# Patient Record
Sex: FEMALE
[De-identification: names, ages, dates, MRNs, and addresses within clinical notes are randomized per-mention and may not be internally consistent; named-entity substitution may affect disease eponyms.]

---

## 2017-12-05 ENCOUNTER — HOSPITAL ENCOUNTER (INPATIENT)
Dept: HOSPITAL 5 - ED | Age: 52
LOS: 3 days | Discharge: HOME | DRG: 280 | End: 2017-12-08
Attending: INTERNAL MEDICINE | Admitting: INTERNAL MEDICINE
Payer: MEDICARE

## 2017-12-05 DIAGNOSIS — I24.9: ICD-10-CM

## 2017-12-05 DIAGNOSIS — Z88.5: ICD-10-CM

## 2017-12-05 DIAGNOSIS — I69.354: ICD-10-CM

## 2017-12-05 DIAGNOSIS — I25.2: ICD-10-CM

## 2017-12-05 DIAGNOSIS — I26.99: ICD-10-CM

## 2017-12-05 DIAGNOSIS — J96.20: ICD-10-CM

## 2017-12-05 DIAGNOSIS — E66.01: ICD-10-CM

## 2017-12-05 DIAGNOSIS — Z82.49: ICD-10-CM

## 2017-12-05 DIAGNOSIS — Z88.8: ICD-10-CM

## 2017-12-05 DIAGNOSIS — Z83.3: ICD-10-CM

## 2017-12-05 DIAGNOSIS — Z88.1: ICD-10-CM

## 2017-12-05 DIAGNOSIS — Z79.4: ICD-10-CM

## 2017-12-05 DIAGNOSIS — D63.1: ICD-10-CM

## 2017-12-05 DIAGNOSIS — I13.2: ICD-10-CM

## 2017-12-05 DIAGNOSIS — Z99.2: ICD-10-CM

## 2017-12-05 DIAGNOSIS — I50.9: ICD-10-CM

## 2017-12-05 DIAGNOSIS — E11.22: ICD-10-CM

## 2017-12-05 DIAGNOSIS — I21.A1: Primary | ICD-10-CM

## 2017-12-05 DIAGNOSIS — J18.9: ICD-10-CM

## 2017-12-05 DIAGNOSIS — Z86.711: ICD-10-CM

## 2017-12-05 DIAGNOSIS — Z79.82: ICD-10-CM

## 2017-12-05 DIAGNOSIS — Z79.899: ICD-10-CM

## 2017-12-05 DIAGNOSIS — N18.6: ICD-10-CM

## 2017-12-05 LAB
ANION GAP SERPL CALC-SCNC: 20 MMOL/L
APTT BLD: 102.6 SEC. (ref 24.2–36.6)
BASOPHILS NFR BLD AUTO: 0.5 % (ref 0–1.8)
BUN SERPL-MCNC: 17 MG/DL (ref 7–17)
BUN/CREAT SERPL: 3 %
CALCIUM SERPL-MCNC: 8.3 MG/DL (ref 8.4–10.2)
CHLORIDE SERPL-SCNC: 98.3 MMOL/L (ref 98–107)
CHOLEST SERPL-MCNC: 173 MG/DL (ref 50–199)
CO2 SERPL-SCNC: 29 MMOL/L (ref 22–30)
EOSINOPHIL NFR BLD AUTO: 3.6 % (ref 0–4.3)
GLUCOSE SERPL-MCNC: 114 MG/DL (ref 65–100)
HCT VFR BLD CALC: 31.3 % (ref 30.3–42.9)
HCT VFR BLD CALC: 32.3 % (ref 30.3–42.9)
HDLC SERPL-MCNC: 34 MG/DL (ref 40–59)
HGB BLD-MCNC: 10.1 GM/DL (ref 10.1–14.3)
HGB BLD-MCNC: 10.1 GM/DL (ref 10.1–14.3)
INR PPP: 1.2 (ref 0.87–1.13)
MCH RBC QN AUTO: 30 PG (ref 28–32)
MCHC RBC AUTO-ENTMCNC: 31 % (ref 30–34)
MCV RBC AUTO: 95 FL (ref 79–97)
PLATELET # BLD: 180 K/MM3 (ref 140–440)
PLATELET # BLD: 199 K/MM3 (ref 140–440)
POTASSIUM SERPL-SCNC: 4.5 MMOL/L (ref 3.6–5)
RBC # BLD AUTO: 3.39 M/MM3 (ref 3.65–5.03)
SODIUM SERPL-SCNC: 143 MMOL/L (ref 137–145)
TRIGL SERPL-MCNC: 190 MG/DL (ref 2–149)
WBC # BLD AUTO: 8.2 K/MM3 (ref 4.5–11)

## 2017-12-05 PROCEDURE — 85379 FIBRIN DEGRADATION QUANT: CPT

## 2017-12-05 PROCEDURE — 85730 THROMBOPLASTIN TIME PARTIAL: CPT

## 2017-12-05 PROCEDURE — 80074 ACUTE HEPATITIS PANEL: CPT

## 2017-12-05 PROCEDURE — 96365 THER/PROPH/DIAG IV INF INIT: CPT

## 2017-12-05 PROCEDURE — 93005 ELECTROCARDIOGRAM TRACING: CPT

## 2017-12-05 PROCEDURE — 84484 ASSAY OF TROPONIN QUANT: CPT

## 2017-12-05 PROCEDURE — 36415 COLL VENOUS BLD VENIPUNCTURE: CPT

## 2017-12-05 PROCEDURE — 85049 AUTOMATED PLATELET COUNT: CPT

## 2017-12-05 PROCEDURE — 96376 TX/PRO/DX INJ SAME DRUG ADON: CPT

## 2017-12-05 PROCEDURE — 80048 BASIC METABOLIC PNL TOTAL CA: CPT

## 2017-12-05 PROCEDURE — 85018 HEMOGLOBIN: CPT

## 2017-12-05 PROCEDURE — 80061 LIPID PANEL: CPT

## 2017-12-05 PROCEDURE — 85025 COMPLETE CBC W/AUTO DIFF WBC: CPT

## 2017-12-05 PROCEDURE — 83880 ASSAY OF NATRIURETIC PEPTIDE: CPT

## 2017-12-05 PROCEDURE — 93306 TTE W/DOPPLER COMPLETE: CPT

## 2017-12-05 PROCEDURE — 94660 CPAP INITIATION&MGMT: CPT

## 2017-12-05 PROCEDURE — 93010 ELECTROCARDIOGRAM REPORT: CPT

## 2017-12-05 PROCEDURE — 94640 AIRWAY INHALATION TREATMENT: CPT

## 2017-12-05 PROCEDURE — 87040 BLOOD CULTURE FOR BACTERIA: CPT

## 2017-12-05 PROCEDURE — 71010: CPT

## 2017-12-05 PROCEDURE — 85610 PROTHROMBIN TIME: CPT

## 2017-12-05 PROCEDURE — 85014 HEMATOCRIT: CPT

## 2017-12-05 PROCEDURE — 94760 N-INVAS EAR/PLS OXIMETRY 1: CPT

## 2017-12-05 PROCEDURE — 96375 TX/PRO/DX INJ NEW DRUG ADDON: CPT

## 2017-12-05 RX ADMIN — FAMOTIDINE SCH MG: 10 TABLET ORAL at 22:34

## 2017-12-05 RX ADMIN — NYSTATIN SCH APPLIC: 100000 OINTMENT TOPICAL at 22:21

## 2017-12-05 RX ADMIN — CARVEDILOL SCH: 6.25 TABLET, FILM COATED ORAL at 22:07

## 2017-12-05 NOTE — EMERGENCY DEPARTMENT REPORT
ED Chest Pain HPI





- General


Chief Complaint: Chest Pain


Stated Complaint: CHEST PAIN


Time Seen by Provider: 12/05/17 17:00


Source: patient, EMS, old records reviewed (no other medical record  available 

for review)


Mode of arrival: Stretcher


Limitations: Physical Limitation





- History of Present Illness


Initial Comments: 





52-year-old female with a past medical history morbid obesity, CVA with 

residual left-sided weakness, diabetes type 2, COPD on 3 L of oxygen, end-stage 

disease on dialysis, hypertension, wheelchair dependence, asthma, sleep apnea, 

and history of PE without [facility] nursing home patient presents 

from dialysis with right-sided chest pain.  Patient states she developed right 

sided lateral chest wall pain described like someone is punching her since 3 

AM.  Pain is intermittent and worse with coughing and palpation.  No specific 

alleviating factors reported.  Pain Currently rated 3/10 in intensity.  Patient 

received 1.5-2 hours of her scheduled 5 hour dialysis.  Dialysis was 

interrupted due to drop in her blood pressure.  Patient states she had a recent 

chest x-ray at the nursing home which showed bilateral pneumonia and she has 

been on antibiotics 1 week.  Per MAR patient has been on Levaquin 750mg 7 

days since December 1 and repeat chest x-ray was done today pta however, 

results not included with transfer paperwork.  Patient continues to have cough 

productive of thick white greenish sputum and she reports a fever at the 

nursing home.  Denies shortness of breath, nausea, vomiting, or diaphoresis.  

Patient also feels like her right leg is most swollen than usual but not 

painful.  She has chronic left-sided paralysis secondary to previous CVA with 

chronic edema to the left arm and left leg.  Patient did receive 2 

nitroglycerin and 324 aspirin provided by the dialysis center prior to arrival.

  Dialysis center [facility], she does not know the name of the nephrologist


Severity scale (0 -10): 1





- Related Data


 Allergies











Allergy/AdvReac Type Severity Reaction Status Date / Time


 


amoxicillin Allergy  Unknown Verified 12/05/17 14:51


 


codeine Allergy  Unknown Verified 12/05/17 14:51


 


potassium chloride Allergy  Unknown Verified 12/05/17 14:51


 


prochlorperazine Allergy  Unknown Verified 12/05/17 14:51





[From Compazine]     














Heart Score





- HEART Score


History: Slightly suspicious


EKG: Normal


Age: 45-65


Risk factors: > 3 risk factors or hx of atherosclerotic disease


Troponin: 1-3x normal limit


HEART Score: 4





ED Review of Systems


ROS: 


Stated complaint: CHEST PAIN


Other details as noted in HPI





Comment: All other systems reviewed and negative


Other: 





Constitutional: As per HPI


Eyes: No eye pain visual changes or discharge


ENT: No ear pain or throat pain


Neck: Denies pain


Respiratory: As per HPI


Cardiovascular: As per HPI


GI: Denies abdominal pain, nausea, vomiting, diarrhea


: Patient states she still has urine output


Musculoskeletal: As per HPI


Skin: Denies rash, lesions, erythema


Neurologic: Denies headache, numbness, weakness


Psychiatric: Denies suicidal ideation, hallucinations








ED Past Medical Hx





- Past Medical History


Previous Medical History?: Yes


Hx Hypertension: Yes


Hx CVA: Yes (residual left-sided weakness)


Hx Heart Attack/AMI: Yes


Hx Congestive Heart Failure: Yes


Hx Diabetes: Yes


Hx Renal Disease: Yes


Hx COPD: Yes (on 3 L o2)


Additional medical history: morbid obesity.  sleep apnea





- Surgical History


Past Surgical History?: Yes





- Social History


Smoking Status: Former Smoker


Substance Use Type: None





ED Physical Exam





- General


Limitations: Physical Limitation





- Other


Other exam information: 





General: No limitations, patient is alert in no acute distress


Head exam: Atraumatic, normocephalic


Eyes exam: Normal appearance


ENT: Moist mucous membrane, normal oropharynx


Neck exam: Normal inspection, full range of motion, no meningismus nontender


Respiratory exam: No tachypnea or accessory muscle use, diminished by base of 

the breath sounds


Cardiovascular: Normal rate and rhythm.  Reproducible right anterior lateral 

lower chest wall tenderness to palpation


Abdomen: Soft, nondistended, and  nontender, with normal bowel sounds, no 

rebound, or guarding


Extremity: Full range of motion normal inspection no deformity


Back: Normal Inspection, full range of motion, no tenderness


Neurologic: Alert, oriented x3, cranial nerves intact, left arm and leg 

paralysis secondary to previous CVA with lymphedema.  Mild right lower 

extremity edema, no calf tenderness process 5 upper right and lower right 

extremity strength


Psychiatric: normal affect, normal mood


Skin: Warm, dry, intact





ED Course


 Vital Signs











  12/05/17 12/05/17 12/05/17





  15:26 15:28 15:30


 


Pulse Rate 77 78 78


 


Respiratory 10 L 20 14





Rate   


 


Blood Pressure   


 


Blood Pressure  93/60 





[Right]   


 


O2 Sat by Pulse 94 95 96





Oximetry   














  12/05/17 12/05/17 12/05/17





  15:46 16:00 16:27


 


Pulse Rate 78  


 


Respiratory 11 L  





Rate   


 


Blood Pressure   


 


Blood Pressure   





[Right]   


 


O2 Sat by Pulse 96 99 96





Oximetry   














  12/05/17 12/05/17 12/05/17





  16:30 16:45 17:01


 


Pulse Rate  76 72


 


Respiratory  13 12





Rate   


 


Blood Pressure 101/78 94/62 93/57


 


Blood Pressure   





[Right]   


 


O2 Sat by Pulse 98 97 98





Oximetry   














  12/05/17 12/05/17





  17:15 17:30


 


Pulse Rate 76 74


 


Respiratory 13 12





Rate  


 


Blood Pressure 93/57 101/64


 


Blood Pressure  





[Right]  


 


O2 Sat by Pulse 93 





Oximetry  














- Reevaluation(s)


Reevaluation #1: 





12/05/17 19:53


Was awaiting V/Q results however, I was informed at this time the patient's 

weight exceeded the limit for VQ scan.  Patient making her a different Lasix 80 

mg IV ordered





- Consultations


Consultation #1: 





12/05/17 20:03


DR Handley nephrologist on call consulted to manage Dialysis 





SHEELA score





- Sheela Score


Age > 65: (0) No


Aspirin use within the Past 7 Days: (1) Yes


3 or more CAD Risk Factors: (1) Yes


2 or more Angina events in past 24 hrs: (0) No


Known CAD with more than 50% Stenosis: (1) Yes


Elevated Cardiac Markers: (1) Yes


ST Deviation Greater than 0.5mm: (0) No


SHEELA Score: 4





ED Medical Decision Making





- Lab Data


Result diagrams: 


 12/05/17 15:56





 12/05/17 15:56








 Lab Results











  12/05/17 12/05/17 12/05/17 Range/Units





  15:56 15:56 15:56 


 


WBC  8.2    (4.5-11.0)  K/mm3


 


RBC  3.39 L    (3.65-5.03)  M/mm3


 


Hgb  10.1    (10.1-14.3)  gm/dl


 


Hct  32.3    (30.3-42.9)  %


 


MCV  95    (79-97)  fl


 


MCH  30    (28-32)  pg


 


MCHC  31    (30-34)  %


 


RDW  15.5 H    (13.2-15.2)  %


 


Plt Count  199    (140-440)  K/mm3


 


Lymph % (Auto)  23.9    (13.4-35.0)  %


 


Mono % (Auto)  9.7 H    (0.0-7.3)  %


 


Eos % (Auto)  3.6    (0.0-4.3)  %


 


Baso % (Auto)  0.5    (0.0-1.8)  %


 


Lymph #  2.0    (1.2-5.4)  K/mm3


 


Mono #  0.8    (0.0-0.8)  K/mm3


 


Eos #  0.3    (0.0-0.4)  K/mm3


 


Baso #  0.0    (0.0-0.1)  K/mm3


 


Seg Neutrophils %  62.3    (40.0-70.0)  %


 


Seg Neutrophils #  5.1    (1.8-7.7)  K/mm3


 


D-Dimer     (0-234)  ng/mlDDU


 


Sodium   143   (137-145)  mmol/L


 


Potassium   4.5   (3.6-5.0)  mmol/L


 


Chloride   98.3   ()  mmol/L


 


Carbon Dioxide   29   (22-30)  mmol/L


 


Anion Gap   20   mmol/L


 


BUN   17   (7-17)  mg/dL


 


Creatinine   6.1 H   (0.7-1.2)  mg/dL


 


Estimated GFR   7   ml/min


 


BUN/Creatinine Ratio   3   %


 


Glucose   114 H   ()  mg/dL


 


Calcium   8.3 L   (8.4-10.2)  mg/dL


 


Troponin T   0.278 H*   (0.00-0.029)  ng/mL


 


NT-Pro-B Natriuret Pep    5815 H  (0-900)  pg/mL


 


Triglycerides   190 H   (2-149)  mg/dL


 


Cholesterol   173   ()  mg/dL


 


LDL Cholesterol Direct   101   ()  mg/dL


 


HDL Cholesterol   34 L   (40-59)  mg/dL


 


Cholesterol/HDL Ratio   5.08   %














  12/05/17 Range/Units





  17:29 


 


WBC   (4.5-11.0)  K/mm3


 


RBC   (3.65-5.03)  M/mm3


 


Hgb   (10.1-14.3)  gm/dl


 


Hct   (30.3-42.9)  %


 


MCV   (79-97)  fl


 


MCH   (28-32)  pg


 


MCHC   (30-34)  %


 


RDW   (13.2-15.2)  %


 


Plt Count   (140-440)  K/mm3


 


Lymph % (Auto)   (13.4-35.0)  %


 


Mono % (Auto)   (0.0-7.3)  %


 


Eos % (Auto)   (0.0-4.3)  %


 


Baso % (Auto)   (0.0-1.8)  %


 


Lymph #   (1.2-5.4)  K/mm3


 


Mono #   (0.0-0.8)  K/mm3


 


Eos #   (0.0-0.4)  K/mm3


 


Baso #   (0.0-0.1)  K/mm3


 


Seg Neutrophils %   (40.0-70.0)  %


 


Seg Neutrophils #   (1.8-7.7)  K/mm3


 


D-Dimer  494.09 H  (0-234)  ng/mlDDU


 


Sodium   (137-145)  mmol/L


 


Potassium   (3.6-5.0)  mmol/L


 


Chloride   ()  mmol/L


 


Carbon Dioxide   (22-30)  mmol/L


 


Anion Gap   mmol/L


 


BUN   (7-17)  mg/dL


 


Creatinine   (0.7-1.2)  mg/dL


 


Estimated GFR   ml/min


 


BUN/Creatinine Ratio   %


 


Glucose   ()  mg/dL


 


Calcium   (8.4-10.2)  mg/dL


 


Troponin T   (0.00-0.029)  ng/mL


 


NT-Pro-B Natriuret Pep   (0-900)  pg/mL


 


Triglycerides   (2-149)  mg/dL


 


Cholesterol   ()  mg/dL


 


LDL Cholesterol Direct   ()  mg/dL


 


HDL Cholesterol   (40-59)  mg/dL


 


Cholesterol/HDL Ratio   %














- EKG Data


-: EKG Interpreted by Me (anteroseptal infarct)


EKG shows normal: sinus rhythm, axis (-14), QRS complexes (90), ST-T waves (lat 

t inv avl)


Rate: normal (79)





- EKG Data


When compared to previous EKG there are: previous EKG unavailable





- Radiology Data


Radiology results: report reviewed





Chest x-ray: CHF is suspected with likely mild pulmonary edema and pleural 

effusions.  Atelectasis or pneumonia suspected in the left but the cardiac 

region





- Medical Decision Making





Plans admit patient to hospital for right-sided chest pain likely muscle 

skeletal.  Positive pulmonary edema plus or minus pulmonary infiltrate.  Blood 

cultures ordered.  Levaquin initiated.  D-dimer elevated but patient exceeds 

the weight limit for PTT were unable to obtain a 20-gauge for CT angiogram.  

Nephrologist has been called to start to manage dialysis tomorrow.  Lasix 

ordered to encourage urine output given pulmonary edema on x-ray however, 

patient appears very comfortable.  She would need BiPAP at night.





- Differential Diagnosis


CHF, PE, pneumonia, bronchitis


Critical Care Time: No


Critical care attestation.: 


If time is entered above; I have spent that time in minutes in the direct care 

of this critically ill patient, excluding procedure time.








ED Disposition


Clinical Impression: 


 Pulmonary edema, Pneumonia, Morbid obesity, Chest wall pain, Elevated d-dimer, 

ESRD needing dialysis, Elevated troponin





Disposition: DC-09 OP ADMIT IP TO THIS HOSP


Is pt being admited?: Yes


Condition: Stable


Time of Disposition: 20:02 (Dr Cervantes/hosp)

## 2017-12-05 NOTE — XRAY REPORT
FINAL REPORT



PROCEDURE:  XR CHEST 1V AP



TECHNIQUE:  Chest radiograph anteroposterior view. CPT 65076







HISTORY:  cough, cp, sob 



COMPARISON:  No prior studies are available for comparison.



FINDINGS:  

Changes of CHF are present. Dual-lumen catheter terminates in the

region of the distal SVC and right atrium of the heart. Small

pleural effusions are suspected. Mild pulmonary edema is not

excluded. In the left retrocardiac region there is questionable

atelectasis or pneumonia. 



IMPRESSION:  

CHF is suspected with likely mild pulmonary edema and pleural

effusion. 



Atelectasis or pneumonia is suspected in the left retrocardiac

region.

## 2017-12-05 NOTE — HISTORY AND PHYSICAL REPORT
History of Present Illness


Chief complaint: 





My chest hurts real bad


History of present illness: 





53 YO Female Nursing Home Resident with MO,CVA with LHP, MI, PE, CHF, DM, COPD, 

ESRD on HD, Chronic Respiratory Failure on 3L O2, ROQUE presents to ED for 

evaluation. Pt states that she has experienced pain in her chest for the past 1 

day with worsening symptoms over the past 5 hours. Pt states that the pain is 3-

6/10, crushing in nature, localized to the right lateral chest, nonradiating, 

intermittent, worse with coughing and deep breathing, as well as palpation, not 

worsened with exertion, or relieved with rest. Pt also complains of Right leg/

calf swelling over the past week. Pt states that she had undergone dialysis for 

approximately 1.5 hours and experienced a drop in her blood pressure which 

caused her to have concern. Pt also acknowledges concomitant productive cough 

of whitish sputum while at the nursing home. Patient has been treated with oral 

Levaquin 750mg daily x 7 days for suspected pneumonia.  Pt denies fever, chills

, Palpitations, NVD, Syncope, BRBPR, Recent ill contacts. Pt seen and evaluated 

in ED and found to have symptoms consistent with ACS as well as possible PE. Pt 

initiated on empiric therapeutic anticoagulation, and cardiology team 

consulted. 





Past History


Past Medical History: COPD, diabetes, ESRD, pulmonary embolism, stroke, other (

MO,)


Social history: single


Family history: diabetes, hypertension





Medications and Allergies


 Allergies











Allergy/AdvReac Type Severity Reaction Status Date / Time


 


amoxicillin Allergy  Unknown Verified 12/05/17 14:51


 


codeine Allergy  Unknown Verified 12/05/17 14:51


 


potassium chloride Allergy  Unknown Verified 12/05/17 14:51


 


prochlorperazine Allergy  Unknown Verified 12/05/17 14:51





[From Compazine]     











 Home Medications











 Medication  Instructions  Recorded  Confirmed  Last Taken  Type


 


ALBUTEROL NEB's [Proventil] 2.5 mg IH Q12H 12/05/17 12/05/17 Unknown History


 


Aspirin [Adult Low Dose Aspirin EC] 81 mg PO QDAY 12/05/17 12/05/17 Unknown 

History


 


Calcium Acetate [Phoslo] 2 cap PO TIDWM 12/05/17 12/05/17 Unknown History


 


Carvedilol [Coreg] 6.25 mg PO BID 12/05/17 12/05/17 Unknown History


 


Fluticasone/Salmeterol [Advair 1 puff IH BID 12/05/17 12/05/17 Unknown History





250-50 Diskus]     


 


Insulin Aspart [NovoLOG Flexpen] See Protocol SQ AC 12/05/17 12/05/17 Unknown 

History


 


Losartan [Cozaar] 100 mg PO QDAY 12/05/17 12/05/17 Unknown History


 


Magnesium Hydroxide [Milk of 30 ml PO HS 12/05/17 12/05/17 Unknown History





Magnesia]     


 


Nystatin Oint [Mycostatin Oint] 1 applicatio TP BID 12/05/17 12/05/17 Unknown 

History


 


Oxycodone HCl/Acetaminophen 1 each PO Q8HR PRN 12/05/17 12/05/17 Unknown History





[Percocet 7.5/325 mg]     


 


Polyethylene Glycol 3350 [Miralax 17 gm PO QDAY PRN 12/05/17 12/05/17 Unknown 

History





3350]     


 


Ranitidine HCl [Zantac 300 MG TAB] 300 mg PO BID 12/05/17 12/05/17 Unknown 

History


 


Torsemide [Demadex] 20 mg PO QDAY 12/05/17 12/05/17 Unknown History











Active Meds: 


Active Medications





Levofloxacin/Dextrose (Levaquin 750mg/150ml)  750 mg in 150 mls @ 100 mls/hr IV 

ONCE ONE


   Stop: 12/05/17 21:25











Review of Systems


Constitutional: no weight loss, no weight gain, no fever, no chills


Ears, nose, mouth and throat: no ear pain, no ear discharge, no tinnitis, no 

decreased hearing, no nose pain, no nasal congestion, no nasal discharge


Breasts: no change in shape, no swelling, no mass


Cardiovascular: chest pain


Respiratory: cough, cough with sputum, no excessive sputum, no hemoptysis


Gastrointestinal: no abdominal pain, no nausea, no vomiting, no diarrhea, no 

constipation


Genitourinary Female: no pelvic pain, no flank pain, no menorrhagia, no dysuria

, no urinary frequency


Rectal: no pain, no incontinence, no bleeding


Musculoskeletal: no neck stiffness, no neck pain, no shooting arm pain, no arm 

numbness/tingling, no low back pain


Integumentary: no rash, no pruritis, no redness, no sores, no wounds, no 

jaundice


Neurological: no transient paralysis, no paralysis, no weakness, no parathesias

, no numbness, no tingling, no seizures


Psychiatric: no anxiety, no memory loss, no change in sleep habits, no sleep 

disturbances, no insomnia, no hypersomnia


Endocrine: no cold intolerance, no heat intolerance, no polyphagia, no 

excessive thirst, no polydipsia


Hematologic/Lymphatic: no easy bruising, no easy bleeding


Allergic/Immunologic: no urticaria, no allergic rhinitis, no wheezing





Exam





- Constitutional


Vitals: 


 











Temp Pulse Resp BP Pulse Ox


 


 98.7 F   76   12   107/63   96 


 


 12/05/17 20:05  12/05/17 20:15  12/05/17 20:31  12/05/17 20:31  12/05/17 20:31











General appearance: Present: mild distress





- EENT


Eyes: Present: PERRL


ENT: hearing intact, clear oral mucosa





- Neck


Neck: Present: supple, normal ROM





- Respiratory


Respiratory effort: normal


Respiratory: bilateral: CTA





- Cardiovascular


Heart Sounds: Present: S1 & S2.  Absent: rub, click





- Extremities


Extremities: pulses symmetrical, No edema


Peripheral Pulses: within normal limits





- Abdominal


General gastrointestinal: Present: soft, non-tender, non-distended, normal 

bowel sounds


Female genitourinary: Present: normal





- Integumentary


Integumentary: Present: clear, warm, dry





- Musculoskeletal


Musculoskeletal: strength equal bilaterally





- Psychiatric


Psychiatric: appropriate mood/affect, intact judgment & insight





- Neurologic


Neurologic: CNII-XII intact, moves all extremities





Results





- Labs


CBC & Chem 7: 


 12/05/17 21:03





 12/05/17 15:56


Labs: 


 Abnormal lab results











  12/05/17 12/05/17 12/05/17 Range/Units





  15:56 15:56 15:56 


 


RBC  3.39 L    (3.65-5.03)  M/mm3


 


RDW  15.5 H    (13.2-15.2)  %


 


Mono % (Auto)  9.7 H    (0.0-7.3)  %


 


D-Dimer     (0-234)  ng/mlDDU


 


Creatinine   6.1 H   (0.7-1.2)  mg/dL


 


Glucose   114 H   ()  mg/dL


 


Calcium   8.3 L   (8.4-10.2)  mg/dL


 


Troponin T   0.278 H*   (0.00-0.029)  ng/mL


 


NT-Pro-B Natriuret Pep    5815 H  (0-900)  pg/mL


 


Triglycerides   190 H   (2-149)  mg/dL


 


HDL Cholesterol   34 L   (40-59)  mg/dL














  12/05/17 12/05/17 Range/Units





  17:29 19:42 


 


RBC    (3.65-5.03)  M/mm3


 


RDW    (13.2-15.2)  %


 


Mono % (Auto)    (0.0-7.3)  %


 


D-Dimer  494.09 H   (0-234)  ng/mlDDU


 


Creatinine    (0.7-1.2)  mg/dL


 


Glucose    ()  mg/dL


 


Calcium    (8.4-10.2)  mg/dL


 


Troponin T   0.271 H*  (0.00-0.029)  ng/mL


 


NT-Pro-B Natriuret Pep    (0-900)  pg/mL


 


Triglycerides    (2-149)  mg/dL


 


HDL Cholesterol    (40-59)  mg/dL














Assessment and Plan





- Patient Problems


(1) ACS (acute coronary syndrome)


Current Visit: Yes   Status: Acute   


Plan to address problem: 


Chest Pain protocol: Cardiology consulted, serial cardiac enzymes, ekg, 

telemetry, Echo, Stress test. Cardiology consulted, Heparin drip, 








(2) CHF (congestive heart failure)


Current Visit: Yes   Status: Acute   


Plan to address problem: 


Afterload reduction, diuresis, monitor uop q shift, daily weight, Echo








(3) ESRD (end stage renal disease)


Current Visit: Yes   Status: Acute   


Plan to address problem: 


Nephrology consulted, in ED. 








(4) Acute and chronic respiratory failure


Current Visit: Yes   Status: Acute   


Plan to address problem: 


Supplemental oxygen, nebs, aspiration precautions, supportive care, NIPPV as 

clinically indicated, BLE Duplex to evaluate for DVT








(5) Diabetes


Current Visit: Yes   Status: Acute   


Plan to address problem: 


ADA diet, insulin, accu check








(6) DVT prophylaxis


Current Visit: Yes   Status: Acute

## 2017-12-06 LAB
ANION GAP SERPL CALC-SCNC: 16 MMOL/L
BUN SERPL-MCNC: < 1 MG/DL (ref 7–17)
BUN/CREAT SERPL: 2 %
CALCIUM SERPL-MCNC: 7 MG/DL (ref 8.4–10.2)
CHLORIDE SERPL-SCNC: 96.1 MMOL/L (ref 98–107)
CO2 SERPL-SCNC: 31 MMOL/L (ref 22–30)
GLUCOSE SERPL-MCNC: 173 MG/DL (ref 65–100)
POTASSIUM SERPL-SCNC: 2.2 MMOL/L (ref 3.6–5)
SODIUM SERPL-SCNC: 141 MMOL/L (ref 137–145)

## 2017-12-06 PROCEDURE — 5A1D70Z PERFORMANCE OF URINARY FILTRATION, INTERMITTENT, LESS THAN 6 HOURS PER DAY: ICD-10-PCS | Performed by: INTERNAL MEDICINE

## 2017-12-06 PROCEDURE — 5A09357 ASSISTANCE WITH RESPIRATORY VENTILATION, LESS THAN 24 CONSECUTIVE HOURS, CONTINUOUS POSITIVE AIRWAY PRESSURE: ICD-10-PCS | Performed by: INTERNAL MEDICINE

## 2017-12-06 RX ADMIN — CALCIUM ACETATE SCH MG: 667 CAPSULE ORAL at 17:33

## 2017-12-06 RX ADMIN — TORSEMIDE SCH MG: 10 TABLET ORAL at 17:32

## 2017-12-06 RX ADMIN — ALBUTEROL SULFATE SCH MG: 2.5 SOLUTION RESPIRATORY (INHALATION) at 08:46

## 2017-12-06 RX ADMIN — BUDESONIDE SCH: 0.5 INHALANT RESPIRATORY (INHALATION) at 23:22

## 2017-12-06 RX ADMIN — CALCIUM ACETATE SCH: 667 CAPSULE ORAL at 08:00

## 2017-12-06 RX ADMIN — HEPARIN SODIUM SCH UNIT: 5000 INJECTION, SOLUTION INTRAVENOUS; SUBCUTANEOUS at 22:55

## 2017-12-06 RX ADMIN — LOSARTAN POTASSIUM SCH MG: 50 TABLET, FILM COATED ORAL at 17:32

## 2017-12-06 RX ADMIN — ALBUTEROL SULFATE SCH MG: 2.5 SOLUTION RESPIRATORY (INHALATION) at 00:48

## 2017-12-06 RX ADMIN — BUDESONIDE SCH MG: 0.5 INHALANT RESPIRATORY (INHALATION) at 08:45

## 2017-12-06 RX ADMIN — CALCIUM ACETATE SCH: 667 CAPSULE ORAL at 12:00

## 2017-12-06 RX ADMIN — FAMOTIDINE SCH: 10 TABLET ORAL at 10:00

## 2017-12-06 RX ADMIN — ALBUTEROL SULFATE SCH MG: 2.5 SOLUTION RESPIRATORY (INHALATION) at 20:49

## 2017-12-06 RX ADMIN — MAGNESIUM HYDROXIDE PRN ML: 400 SUSPENSION ORAL at 17:35

## 2017-12-06 RX ADMIN — HEPARIN SODIUM PRN UNIT: 10000 INJECTION, SOLUTION INTRAVENOUS; SUBCUTANEOUS at 15:04

## 2017-12-06 RX ADMIN — ASPIRIN SCH MG: 81 TABLET, COATED ORAL at 17:33

## 2017-12-06 RX ADMIN — CARVEDILOL SCH: 6.25 TABLET, FILM COATED ORAL at 16:53

## 2017-12-06 RX ADMIN — CARVEDILOL SCH: 6.25 TABLET, FILM COATED ORAL at 22:56

## 2017-12-06 RX ADMIN — ARFORMOTEROL TARTRATE SCH MCG: 15 SOLUTION RESPIRATORY (INHALATION) at 08:45

## 2017-12-06 RX ADMIN — FAMOTIDINE SCH MG: 10 TABLET ORAL at 22:55

## 2017-12-06 NOTE — PROGRESS NOTE
Assessment and Plan


Assessment and plan: 


53 YO Female Nursing Home Resident with MO,CVA with LHP, MI, PE, CHF, DM, COPD, 

ESRD on HD, Chronic Respiratory Failure on 3L O2, ROQUE presents to ED for 

evaluation. Pt states that she has experienced pain in her chest for the past 1 

day with worsening symptoms over the past 5 hours. Pt states that the pain is 3-

6/10, crushing in nature, localized to the right lateral chest, nonradiating, 

intermittent, worse with coughing and deep breathing, as well as palpation, not 

worsened with exertion, or relieved with rest. Pt also complains of Right leg/

calf swelling over the past week. Pt states that she had undergone dialysis for 

approximately 1.5 hours and experienced a drop in her blood pressure which 

caused her to have concern. Pt also acknowledges concomitant productive cough 

of whitish sputum while at the nursing home. Patient has been treated with oral 

Levaquin 750mg daily x 7 days for suspected pneumonia.  Pt denies fever, chills

, Palpitations, NVD, Syncope, BRBPR, Recent ill contacts. Pt seen and evaluated 

in ED and found to have symptoms consistent with ACS as well as possible PE. Pt 

initiated on empiric therapeutic anticoagulation, and cardiology team 

consulted. 








Acute on chronic Respiratory failure


Morbid obesity


Presumed obesity hyperventilation syndrome


Acute on chronic congestive heart failure


ESRD


Diabetes mellitus


Severe hypokalemia- Probably erroronous, patient refusing redraw labs, she 

understands the risk associated, continue remote Tele


Type 2 MI secondary to ESRD





PLAN


* Supportive care


* Cardiology and Nephrology input noted, case discussed with Nephrology


* Unable to obtain CTA OR V/Q AND STRESS TEST due to weight, Considering 

improvement of symptoms will check Lower ext doppler and if negative will not 

further purse prior studies, although patient is aware that small chance exist


* BIPAP as needed


* Dily weight and I/O


* Echo pending


* obtain record from [facility]


* Accucheck AC/HS, insulin sliding scale


* DVT/GI prophy


*   








History


Interval history: 





patient seen and examined in the DIALYSIS UNIT. reports improvement in 

shortness of breath and chest pain. Denies any fever, nausea, vomiting or 

diarrhea. states that she is in a nursing home following a recent 

hospitalization. 





Hospitalist Physical





- Constitutional


Vitals: 


 











Temp Pulse Resp BP Pulse Ox


 


 98.6 F   79   18   119/52   96 


 


 12/06/17 13:20  12/06/17 13:20  12/06/17 13:20  12/06/17 13:20  12/06/17 08:50











General appearance: Present: no acute distress, well-nourished, obese (morbidly)





- EENT


Eyes: Present: PERRL, irregular pupil





- Neck


Neck: Present: supple, normal ROM





- Respiratory


Respiratory effort: normal


Respiratory: bilateral: diminished





- Cardiovascular


Rhythm: regular


Heart Sounds: Present: S1 & S2.  Absent: systolic murmur, diastolic murmur





- Extremities


Extremities: no ischemia, pulses intact, pulses symmetrical, Full ROM


Extremity abnormal: edema (+1)


Peripheral Pulses: within normal limits





- Abdominal


General gastrointestinal: soft, non-tender, non-distended, normal bowel sounds, 

other (nlarged panus)





- Integumentary


Integumentary: Present: clear, warm, dry





- Psychiatric


Psychiatric: appropriate mood/affect, intact judgment & insight, cooperative





- Neurologic


Neurologic: CNII-XII intact





Results





- Labs


CBC & Chem 7: 


 12/07/17 04:13





 12/06/17 23:48


Labs: 


 Laboratory Last Values











WBC  8.2 K/mm3 (4.5-11.0)   12/05/17  15:56    


 


RBC  3.39 M/mm3 (3.65-5.03)  L  12/05/17  15:56    


 


Hgb  10.1 gm/dl (10.1-14.3)   12/05/17  21:03    


 


Hct  31.3 % (30.3-42.9)   12/05/17  21:03    


 


MCV  95 fl (79-97)   12/05/17  15:56    


 


MCH  30 pg (28-32)   12/05/17  15:56    


 


MCHC  31 % (30-34)   12/05/17  15:56    


 


RDW  15.5 % (13.2-15.2)  H  12/05/17  15:56    


 


Plt Count  180 K/mm3 (140-440)   12/05/17  21:03    


 


Lymph % (Auto)  23.9 % (13.4-35.0)   12/05/17  15:56    


 


Mono % (Auto)  9.7 % (0.0-7.3)  H  12/05/17  15:56    


 


Eos % (Auto)  3.6 % (0.0-4.3)   12/05/17  15:56    


 


Baso % (Auto)  0.5 % (0.0-1.8)   12/05/17  15:56    


 


Lymph #  2.0 K/mm3 (1.2-5.4)   12/05/17  15:56    


 


Mono #  0.8 K/mm3 (0.0-0.8)   12/05/17  15:56    


 


Eos #  0.3 K/mm3 (0.0-0.4)   12/05/17  15:56    


 


Baso #  0.0 K/mm3 (0.0-0.1)   12/05/17  15:56    


 


Seg Neutrophils %  62.3 % (40.0-70.0)   12/05/17  15:56    


 


Seg Neutrophils #  5.1 K/mm3 (1.8-7.7)   12/05/17  15:56    


 


PT  15.9 Sec. (12.2-14.9)  H  12/05/17  17:29    


 


INR  1.20  (0.87-1.13)  H  12/05/17  17:29    


 


APTT  102.6 Sec. (24.2-36.6)  H*  12/05/17  17:29    


 


D-Dimer  494.09 ng/mlDDU (0-234)  H  12/05/17  17:29    


 


Sodium  141 mmol/L (137-145)   12/06/17  12:03    


 


Potassium  2.2 mmol/L (3.6-5.0)  L* D 12/06/17  12:03    


 


Chloride  96.1 mmol/L ()  L  12/06/17  12:03    


 


Carbon Dioxide  31 mmol/L (22-30)  H  12/06/17  12:03    


 


Anion Gap  16 mmol/L  12/06/17  12:03    


 


BUN  < 1 mg/dL (7-17)  L  12/06/17  12:03    


 


Creatinine  0.6 mg/dL (0.7-1.2)  L D 12/06/17  12:03    


 


Estimated GFR  > 60 ml/min  12/06/17  12:03    


 


BUN/Creatinine Ratio  2 %  12/06/17  12:03    


 


Glucose  173 mg/dL ()  H  12/06/17  12:03    


 


Calcium  7.0 mg/dL (8.4-10.2)  L D 12/06/17  12:03    


 


Troponin T  0.320 ng/mL (0.00-0.029)  H*  12/06/17  12:03    


 


NT-Pro-B Natriuret Pep  5815 pg/mL (0-900)  H  12/05/17  15:56    


 


Triglycerides  190 mg/dL (2-149)  H  12/05/17  15:56    


 


Cholesterol  173 mg/dL ()   12/05/17  15:56    


 


LDL Cholesterol Direct  101 mg/dL ()   12/05/17  15:56    


 


HDL Cholesterol  34 mg/dL (40-59)  L  12/05/17  15:56    


 


Cholesterol/HDL Ratio  5.08 %  12/05/17  15:56    


 


Hepatitis A IgM Ab  Non-reactive  (NonReactive)   12/06/17  12:03    


 


Hep Bs Antigen  Non-reactive  (Negative)   12/06/17  12:03    


 


Hep B Core IgM Ab  Non-reactive  (NonReactive)   12/06/17  12:03    


 


Hepatitis C Antibody  Non-reactive  (NonReactive)   12/06/17  12:03    














- Imaging and Cardiology


Chest x-ray: image reviewed (fluid overload)

## 2017-12-06 NOTE — CONSULTATION
History of Present Illness


Consult date: 12/06/17


Consult reason: chest pain


History of present illness: 





This is a 52yr old woman Nursing home resident whom is bed bound with multiple 

medical problems was sent from dialysis with complaints of right sided chest 

pain. No reported aggravating or relieving factors. A chest x-ray suggestive of 

CHF with atelectasis versus pneumonia. WBC is normal. Patient remains afebrile. 

Her ECG is a sinus rhythm, no acute ischemic changes. Patient was admitted for 

rule out acute coronary syndrome with a persantine thallium stress test but 

later canceled due to the patient weight exceeds the table limit. Cardiology 

consultation was requested.





Past History


Past Medical History: COPD, diabetes, ESRD, pulmonary embolism, stroke, other (

MO,)


Social history: single


Family history: diabetes, hypertension





Medications and Allergies


 Allergies











Allergy/AdvReac Type Severity Reaction Status Date / Time


 


amoxicillin Allergy  Unknown Verified 12/05/17 14:51


 


codeine Allergy  Unknown Verified 12/05/17 14:51


 


potassium chloride Allergy  Unknown Verified 12/05/17 14:51


 


prochlorperazine Allergy  Unknown Verified 12/05/17 14:51





[From Compazine]     











 Home Medications











 Medication  Instructions  Recorded  Confirmed  Last Taken  Type


 


ALBUTEROL NEB's [Proventil] 2.5 mg IH Q12H 12/05/17 12/05/17 Unknown History


 


Aspirin [Adult Low Dose Aspirin EC] 81 mg PO QDAY 12/05/17 12/05/17 Unknown 

History


 


Calcium Acetate [Phoslo] 2 cap PO TIDWM 12/05/17 12/05/17 Unknown History


 


Carvedilol [Coreg] 6.25 mg PO BID 12/05/17 12/05/17 Unknown History


 


Fluticasone/Salmeterol [Advair 1 puff IH BID 12/05/17 12/05/17 Unknown History





250-50 Diskus]     


 


Insulin Aspart [NovoLOG Flexpen] See Protocol SQ AC 12/05/17 12/05/17 Unknown 

History


 


Losartan [Cozaar] 100 mg PO QDAY 12/05/17 12/05/17 Unknown History


 


Magnesium Hydroxide [Milk of 30 ml PO HS 12/05/17 12/05/17 Unknown History





Magnesia]     


 


Nystatin Oint [Mycostatin Oint] 1 applicatio TP BID 12/05/17 12/05/17 Unknown 

History


 


Oxycodone HCl/Acetaminophen 1 each PO Q8HR PRN 12/05/17 12/05/17 Unknown History





[Percocet 7.5/325 mg]     


 


Polyethylene Glycol 3350 [Miralax 17 gm PO QDAY PRN 12/05/17 12/05/17 Unknown 

History





3350]     


 


Ranitidine HCl [Zantac 300 MG TAB] 300 mg PO BID 12/05/17 12/05/17 Unknown 

History


 


Torsemide [Demadex] 20 mg PO QDAY 12/05/17 12/05/17 Unknown History











Active Meds: 


Active Medications





Acetaminophen (Tylenol)  650 mg PO Q4H PRN


   PRN Reason: Pain MILD(1-3)/Fever >100.5/HA


Albuterol (Proventil)  2.5 mg IH Q4HRT PRN


   PRN Reason: Shortness Of Breath


Albuterol (Proventil)  2.5 mg IH Q12HRT [facility]


   Last Admin: 12/06/17 08:46 Dose:  2.5 mg


Arformoterol Tartrate (Brovana Nebu)  15 mcg IH Q12HRT [facility]


   Last Admin: 12/06/17 08:45 Dose:  15 mcg


Aspirin (Halfprin Ec)  81 mg PO QDAY [facility]


Bisacodyl (Dulcolax)  10 mg SC QDAY PRN


   PRN Reason: Constipation unrelieved by MOM


Budesonide (Pulmicort)  0.5 mg IH Q12HRT [facility]


   Last Admin: 12/06/17 08:45 Dose:  0.5 mg


Calcium Acetate (Phoslo)  1,334 mg PO TIDWM [facility]


Carvedilol (Coreg)  6.25 mg PO BID [facility]


   Last Admin: 12/05/17 22:07 Dose:  Not Given


Famotidine (Pepcid)  10 mg PO BID [facility]


   Last Admin: 12/05/17 22:34 Dose:  10 mg


Heparin Sodium (Porcine) (Heparin)  5,000 unit IV ISAIAH PRN


   PRN Reason: hemodialysis


Heparin Sodium/Sodium Chloride (Heparin/ 0.45% Nacl-25,000 Unit/500 Ml)  25,000 

unit in 500 mls @ 30 mls/hr IV TITR MICHAEL; 1,500 UNITS/HR


   PRN Reason: Protocol


Sodium Chloride (Nacl 0.9%)  100 mls @ 999 mls/hr IV ISAIAH PRN


   PRN Reason: Hypotension


Sodium Chloride (Nacl 0.9%)  100 mls @ 999 mls/hr IV ISAIAH PRN


   PRN Reason: Hypotension


Losartan Potassium (Cozaar)  100 mg PO QDAY [facility]


Magnesium Hydroxide (Milk Of Magnesia)  30 ml PO Q4H PRN


   PRN Reason: Constipation


Nitroglycerin (Nitrostat)  0.4 mg SL Q5M PRN


   PRN Reason: Chest Pain


Nystatin (Mycostatin)  1 applic TP BID [facility]


   Last Admin: 12/05/17 22:21 Dose:  1 applic


Ondansetron HCl (Zofran)  4 mg IV Q8H PRN


   PRN Reason: N/V unrelieved by Reglan


Oxycodone/Acetaminophen (Percocet 5/325)  1.5 tab PO Q8H PRN


   PRN Reason: Pain


Polyethylene Glycol (Miralax 3350)  17 gm PO QDAY PRN


   PRN Reason: Constipation


Sodium Chloride (Sodium Chloride Flush Syringe 10 Ml)  10 ml IV PRN PRN


   PRN Reason: LINE FLUSH


Torsemide (Demadex)  20 mg PO QDAY [facility]











Physical Examination


 Vital Signs











Pulse Resp Pulse Ox


 


 77   10 L  94 


 


 12/05/17 15:26  12/05/17 15:26  12/05/17 15:26











General appearance: no acute distress


Cardiac: Positive: Reg Rate and Rhythm





Results





 12/05/17 21:03





 12/05/17 15:56


 Coagulation











  12/05/17 Range/Units





  17:29 


 


PT  15.9 H  (12.2-14.9)  Sec.


 


INR  1.20 H  (0.87-1.13)  


 


APTT  102.6 H*  (24.2-36.6)  Sec.








 Lipids











  12/05/17 Range/Units





  15:56 


 


Triglycerides  190 H  (2-149)  mg/dL


 


Cholesterol  173  ()  mg/dL


 


HDL Cholesterol  34 L  (40-59)  mg/dL


 


Cholesterol/HDL Ratio  5.08  %








 CBC











  12/05/17 12/05/17 Range/Units





  15:56 21:03 


 


WBC  8.2   (4.5-11.0)  K/mm3


 


RBC  3.39 L   (3.65-5.03)  M/mm3


 


Hgb  10.1  10.1  (10.1-14.3)  gm/dl


 


Hct  32.3  31.3  (30.3-42.9)  %


 


Plt Count  199  180  (140-440)  K/mm3


 


Lymph #  2.0   (1.2-5.4)  K/mm3


 


Mono #  0.8   (0.0-0.8)  K/mm3


 


Eos #  0.3   (0.0-0.4)  K/mm3


 


Baso #  0.0   (0.0-0.1)  K/mm3








 Comprehensive Metabolic Panel











  12/05/17 Range/Units





  15:56 


 


Sodium  143  (137-145)  mmol/L


 


Potassium  4.5  (3.6-5.0)  mmol/L


 


Chloride  98.3  ()  mmol/L


 


Carbon Dioxide  29  (22-30)  mmol/L


 


BUN  17  (7-17)  mg/dL


 


Creatinine  6.1 H  (0.7-1.2)  mg/dL


 


Glucose  114 H  ()  mg/dL


 


Calcium  8.3 L  (8.4-10.2)  mg/dL














Assessment and Plan





Chest pain


ESRD on dialysis


Elevated troponin


   likely in the setting of renal disease


Prior CVA


Hypertension


DM


COPD on 3L oxygen


Sleep apnea


Morbid obesity

## 2017-12-06 NOTE — CONSULTATION
History of Present Illness





- Reason for Consult


Consult date: 12/06/17


end stage renal disease





- History of Present Illness


Ms Mckinley is a 53 y/o lady with a PMH of ESRD on HD TTS via Rt TDC, CHF, DM2

, COPD, PE, CAD, Morbid obesity who has been admitted to the [facility] with 

worsening chest pain. Pt has also had some cough and SHOB. She denies fever, N/V

, belly pain, diarrhea. Pt has been on levaquin recently for suspected PNA. Pt 

had HD yesterday for around 1.5 hours which was stopped owing to low BP. Pt had 

a CXR done in the ER which showed congestion. Pt's toponin levels have also 

been slightly elevated and there has been concern for PE. 





ROS:


As in HPI otherwise 12 point review of systems -ve





Past History


Past Medical History: COPD, diabetes, ESRD, pulmonary embolism, stroke, other (

MO,)


Social history: single


Family history: diabetes, hypertension





Past History


Past Medical History: COPD, diabetes, ESRD, pulmonary embolism, stroke, other (

MO,)


Social history: single


Family history: diabetes, hypertension





Medications and Allergies


 Allergies











Allergy/AdvReac Type Severity Reaction Status Date / Time


 


amoxicillin Allergy  Unknown Verified 12/05/17 14:51


 


codeine Allergy  Unknown Verified 12/05/17 14:51


 


potassium chloride Allergy  Unknown Verified 12/05/17 14:51


 


prochlorperazine Allergy  Unknown Verified 12/05/17 14:51





[From Compazine]     











 Home Medications











 Medication  Instructions  Recorded  Confirmed  Last Taken  Type


 


ALBUTEROL NEB's [Proventil] 2.5 mg IH Q12H 12/05/17 12/05/17 Unknown History


 


Aspirin [Adult Low Dose Aspirin EC] 81 mg PO QDAY 12/05/17 12/05/17 Unknown 

History


 


Calcium Acetate [Phoslo] 2 cap PO TIDWM 12/05/17 12/05/17 Unknown History


 


Carvedilol [Coreg] 6.25 mg PO BID 12/05/17 12/05/17 Unknown History


 


Fluticasone/Salmeterol [Advair 1 puff IH BID 12/05/17 12/05/17 Unknown History





250-50 Diskus]     


 


Insulin Aspart [NovoLOG Flexpen] See Protocol SQ AC 12/05/17 12/05/17 Unknown 

History


 


Losartan [Cozaar] 100 mg PO QDAY 12/05/17 12/05/17 Unknown History


 


Magnesium Hydroxide [Milk of 30 ml PO HS 12/05/17 12/05/17 Unknown History





Magnesia]     


 


Nystatin Oint [Mycostatin Oint] 1 applicatio TP BID 12/05/17 12/05/17 Unknown 

History


 


Oxycodone HCl/Acetaminophen 1 each PO Q8HR PRN 12/05/17 12/05/17 Unknown History





[Percocet 7.5/325 mg]     


 


Polyethylene Glycol 3350 [Miralax 17 gm PO QDAY PRN 12/05/17 12/05/17 Unknown 

History





3350]     


 


Ranitidine HCl [Zantac 300 MG TAB] 300 mg PO BID 12/05/17 12/05/17 Unknown 

History


 


Torsemide [Demadex] 20 mg PO QDAY 12/05/17 12/05/17 Unknown History











Active Meds: 


Active Medications





Acetaminophen (Tylenol)  650 mg PO Q4H PRN


   PRN Reason: Pain MILD(1-3)/Fever >100.5/HA


Albuterol (Proventil)  2.5 mg IH Q4HRT PRN


   PRN Reason: Shortness Of Breath


Albuterol (Proventil)  2.5 mg IH Q12HRT [facility]


   Last Admin: 12/06/17 08:46 Dose:  2.5 mg


Arformoterol Tartrate (Brovana Nebu)  15 mcg IH Q12HRT [facility] of [facility]


   Last Admin: 12/06/17 08:45 Dose:  15 mcg


Aspirin (Halfprin Ec)  81 mg PO QDAY [facility]


Bisacodyl (Dulcolax)  10 mg MA QDAY PRN


   PRN Reason: Constipation unrelieved by MOM


Budesonide (Pulmicort)  0.5 mg IH Q12HRT [facility]


   Last Admin: 12/06/17 08:45 Dose:  0.5 mg


Calcium Acetate (Phoslo)  1,334 mg PO TIDWM [facility]


Carvedilol (Coreg)  6.25 mg PO BID [facility]


   Last Admin: 12/05/17 22:07 Dose:  Not Given


Famotidine (Pepcid)  10 mg PO BID [facility]


   Last Admin: 12/05/17 22:34 Dose:  10 mg


Heparin Sodium (Porcine) (Heparin)  5,000 unit IV ISAIAH PRN


   PRN Reason: hemodialysis


Heparin Sodium/Sodium Chloride (Heparin/ 0.45% Nacl-25,000 Unit/500 Ml)  25,000 

unit in 500 mls @ 30 mls/hr IV TITR MICHAEL; 1,500 UNITS/HR


   PRN Reason: Protocol


Sodium Chloride (Nacl 0.9%)  100 mls @ 999 mls/hr IV ISAIAH PRN


   PRN Reason: Hypotension


Sodium Chloride (Nacl 0.9%)  100 mls @ 999 mls/hr IV ISAIAH PRN


   PRN Reason: Hypotension


Losartan Potassium (Cozaar)  100 mg PO QDAY [facility]


Magnesium Hydroxide (Milk Of Magnesia)  30 ml PO Q4H PRN


   PRN Reason: Constipation


Nitroglycerin (Nitrostat)  0.4 mg SL Q5M PRN


   PRN Reason: Chest Pain


Nystatin (Mycostatin)  1 applic TP BID [facility]


   Last Admin: 12/05/17 22:21 Dose:  1 applic


Ondansetron HCl (Zofran)  4 mg IV Q8H PRN


   PRN Reason: N/V unrelieved by Reglan


Oxycodone/Acetaminophen (Percocet 5/325)  1.5 tab PO Q8H PRN


   PRN Reason: Pain


Polyethylene Glycol (Miralax 3350)  17 gm PO QDAY PRN


   PRN Reason: Constipation


Sodium Chloride (Sodium Chloride Flush Syringe 10 Ml)  10 ml IV PRN PRN


   PRN Reason: LINE FLUSH


Torsemide (Demadex)  20 mg PO QDAY On [facility] of [facility]











Exam





- Vital Signs


Vital signs: 


 Vital Signs











Pulse Resp Pulse Ox


 


 77   10 L  94 


 


 12/05/17 15:26  12/05/17 15:26  12/05/17 15:26














- Physical Exam


Narrative exam: 


GE: AAOX3, Obese lady


 HEENT: PERRLA


 Neck: Supple


 Chest: BL Crackles, Rt Cw tdc


 CVS: RRR


 Abd: Soft/Obese, BS+


 Ext: 1-2+ BLE edema, LUE AVF


 Psyche: Appropriate mood








Results





- Lab Results





 12/05/17 21:03





 12/06/17 12:03


 Most recent lab results











Calcium  8.3 mg/dL (8.4-10.2)  L  12/05/17  15:56    














Assessment and Plan


ESRD on hemodialysis:


Volume overload:


-CXR congested. Plan for extra HD today. HD tomorrow as well, will eval for HD 

need daily


-Renally dose all meds


-Check BMP/Mg/Phos daily





Suspected PE:


-VQ Scan could not be done due to morbid obesity


-On anticoagulation per primary


-Consider DVT US of legs





Acute coronary syndrome:


Chest Pain:


-Cards consulted, per them


-Has h/o CHF per notes





Diabetes mellitus type 2 on insulin:


-On ISS


-Per primary





Anemia of chronic disease due to ESRD:


-Epogen to keep Hg 10-12





Bone Metabolic disease:


-Continue phos binder





Morbid obesity:


Obstructive Sleep apnea:


-Counselled to loose wt


-CPAP per primary





Plan d/w HD RN.





Vahe Handley MD


Nephrology, Hypertension, Dialysis, Transplantation


Phone no: [phone]

## 2017-12-07 LAB
ANION GAP SERPL CALC-SCNC: 18 MMOL/L
BUN SERPL-MCNC: 14 MG/DL (ref 7–17)
BUN/CREAT SERPL: 3 %
CALCIUM SERPL-MCNC: 8.2 MG/DL (ref 8.4–10.2)
CHLORIDE SERPL-SCNC: 98.6 MMOL/L (ref 98–107)
CO2 SERPL-SCNC: 31 MMOL/L (ref 22–30)
GLUCOSE SERPL-MCNC: 153 MG/DL (ref 65–100)
HCT VFR BLD CALC: 30.9 % (ref 30.3–42.9)
HGB BLD-MCNC: 10 GM/DL (ref 10.1–14.3)
PLATELET # BLD: 173 K/MM3 (ref 140–440)
POTASSIUM SERPL-SCNC: 4.1 MMOL/L (ref 3.6–5)
SODIUM SERPL-SCNC: 143 MMOL/L (ref 137–145)

## 2017-12-07 PROCEDURE — 5A1D70Z PERFORMANCE OF URINARY FILTRATION, INTERMITTENT, LESS THAN 6 HOURS PER DAY: ICD-10-PCS | Performed by: INTERNAL MEDICINE

## 2017-12-07 RX ADMIN — MAGNESIUM HYDROXIDE PRN ML: 400 SUSPENSION ORAL at 10:42

## 2017-12-07 RX ADMIN — ALBUTEROL SULFATE SCH: 2.5 SOLUTION RESPIRATORY (INHALATION) at 08:41

## 2017-12-07 RX ADMIN — CARVEDILOL SCH MG: 6.25 TABLET, FILM COATED ORAL at 09:41

## 2017-12-07 RX ADMIN — CALCIUM ACETATE SCH: 667 CAPSULE ORAL at 17:30

## 2017-12-07 RX ADMIN — NYSTATIN SCH APPLIC: 100000 OINTMENT TOPICAL at 23:24

## 2017-12-07 RX ADMIN — FAMOTIDINE SCH MG: 10 TABLET ORAL at 21:44

## 2017-12-07 RX ADMIN — ARFORMOTEROL TARTRATE SCH: 15 SOLUTION RESPIRATORY (INHALATION) at 02:03

## 2017-12-07 RX ADMIN — FAMOTIDINE SCH MG: 10 TABLET ORAL at 09:41

## 2017-12-07 RX ADMIN — BUDESONIDE SCH MG: 0.5 INHALANT RESPIRATORY (INHALATION) at 08:41

## 2017-12-07 RX ADMIN — CALCIUM ACETATE SCH: 667 CAPSULE ORAL at 15:46

## 2017-12-07 RX ADMIN — BUDESONIDE SCH MG: 0.5 INHALANT RESPIRATORY (INHALATION) at 19:55

## 2017-12-07 RX ADMIN — ALBUTEROL SULFATE SCH MG: 2.5 SOLUTION RESPIRATORY (INHALATION) at 19:55

## 2017-12-07 RX ADMIN — ASPIRIN SCH MG: 81 TABLET, COATED ORAL at 09:41

## 2017-12-07 RX ADMIN — NYSTATIN SCH: 100000 OINTMENT TOPICAL at 15:46

## 2017-12-07 RX ADMIN — ARFORMOTEROL TARTRATE SCH MCG: 15 SOLUTION RESPIRATORY (INHALATION) at 19:55

## 2017-12-07 RX ADMIN — ACETAMINOPHEN PRN MG: 325 TABLET ORAL at 21:44

## 2017-12-07 RX ADMIN — TORSEMIDE SCH MG: 10 TABLET ORAL at 09:41

## 2017-12-07 RX ADMIN — HEPARIN SODIUM SCH UNIT: 5000 INJECTION, SOLUTION INTRAVENOUS; SUBCUTANEOUS at 23:04

## 2017-12-07 RX ADMIN — ARFORMOTEROL TARTRATE SCH MCG: 15 SOLUTION RESPIRATORY (INHALATION) at 08:41

## 2017-12-07 RX ADMIN — HEPARIN SODIUM SCH UNIT: 5000 INJECTION, SOLUTION INTRAVENOUS; SUBCUTANEOUS at 09:42

## 2017-12-07 RX ADMIN — HEPARIN SODIUM PRN UNIT: 10000 INJECTION, SOLUTION INTRAVENOUS; SUBCUTANEOUS at 17:05

## 2017-12-07 RX ADMIN — LOSARTAN POTASSIUM SCH MG: 50 TABLET, FILM COATED ORAL at 09:41

## 2017-12-07 RX ADMIN — CALCIUM ACETATE SCH MG: 667 CAPSULE ORAL at 09:41

## 2017-12-07 RX ADMIN — CARVEDILOL SCH: 6.25 TABLET, FILM COATED ORAL at 22:00

## 2017-12-07 NOTE — PROGRESS NOTE
Assessment and Plan





Chest pain, atypical


   pt exceeds weight limit for an MPI


ESRD on dialysis


Elevated troponin, nonspecific


   likely in the setting of renal disease


Prior CVA


Hypertension


DM


COPD on 3L oxygen


Sleep apnea


Morbid obesity





Patient is considered a poor candidate for cardiac workup due to multiple co-

morbidities.


Conservative cardiac management





Subjective


Date of service: 12/07/17


Interval history: 





Patient has no complaints. 


No events on telemetry monitoring.





Objective


 Vital Signs











  Temp Pulse Pulse Resp Resp BP BP


 


 12/07/17 13:45   78     110/58 


 


 12/07/17 13:30   79     112/59 


 


 12/07/17 13:15   76     109/58 


 


 12/07/17 13:00   75     112/58 


 


 12/07/17 12:45   71     106/56 


 


 12/07/17 12:30   78     102/60 


 


 12/07/17 12:15   78     99/60 


 


 12/07/17 12:00   77     103/60 


 


 12/07/17 11:45   120 H     113/57 


 


 12/07/17 11:30   81     91/45 


 


 12/07/17 11:25   78     112/58 


 


 12/07/17 11:20  98.3 F  80   18   110/60 


 


 12/07/17 08:42       


 


 12/07/17 08:41    85   19  


 


 12/07/17 06:14  98.1 F  75   20   89/45 


 


 12/07/17 02:31  97.5 F L  82   20    88/44


 


 12/06/17 21:39       


 


 12/06/17 21:06   84  84   17  


 


 12/06/17 19:35  98.3 F  83   20   99/63 














  Pulse Ox


 


 12/07/17 13:45 


 


 12/07/17 13:30 


 


 12/07/17 13:15 


 


 12/07/17 13:00 


 


 12/07/17 12:45 


 


 12/07/17 12:30 


 


 12/07/17 12:15 


 


 12/07/17 12:00 


 


 12/07/17 11:45 


 


 12/07/17 11:30 


 


 12/07/17 11:25 


 


 12/07/17 11:20 


 


 12/07/17 08:42  97


 


 12/07/17 08:41 


 


 12/07/17 06:14  94


 


 12/07/17 02:31  94


 


 12/06/17 21:39  92


 


 12/06/17 21:06  97


 


 12/06/17 19:35  89














- Physical Examination


General: No Apparent Distress


Cardiac: Positive: Reg Rate and Rhythm





- Labs and Meds


 CBC











  12/07/17 Range/Units





  04:13 


 


Hgb  10.0 L  (10.1-14.3)  gm/dl


 


Hct  30.9  (30.3-42.9)  %


 


Plt Count  173  (140-440)  K/mm3








 Comprehensive Metabolic Panel











  12/06/17 Range/Units





  23:48 


 


Sodium  143  (137-145)  mmol/L


 


Potassium  4.1  D  (3.6-5.0)  mmol/L


 


Chloride  98.6  ()  mmol/L


 


Carbon Dioxide  31 H  (22-30)  mmol/L


 


BUN  14  (7-17)  mg/dL


 


Creatinine  5.5 H D  (0.7-1.2)  mg/dL


 


Glucose  153 H  ()  mg/dL


 


Calcium  8.2 L D  (8.4-10.2)  mg/dL

## 2017-12-07 NOTE — PROGRESS NOTE
Assessment and Plan


ESRD on hemodialysis:


Volume overload:


-CXR was congested. s/p HD yesterday, HD today. Will eval for HD need daily.


-Check CXR in am for volume status.


-Renally dose all meds


-Check BMP/Mg/Phos daily





Suspected PE:


-VQ Scan/CTPE could not be done due to morbid obesity


-Per primary.





Acute coronary syndrome:


Chest Pain:


-Cards consulted, per them


-Has h/o CHF per notes





Diabetes mellitus type 2 on insulin:


-On ISS


-Per primary





Anemia of chronic disease due to ESRD:


-Epogen to keep Hg 10-12





Bone Metabolic disease:


-Continue phos binder





Morbid obesity:


Obstructive Sleep apnea:


-Counselled to loose wt


-CPAP per primary





Plan d/w HD RN.





Vahe Handley MD


Nephrology, Hypertension, Dialysis, Transplantation


Phone no: [phone]











Subjective


Date of service: 12/07/17


Interval history: 


Seen on HD. Still having Shortness of breath.





Objective





- Exam


Narrative Exam: 


GE: AAOX3, Obese lady


 HEENT: PERRLA


 Neck: Supple


 Chest: BL Crackles, Rt Cw tdc


 CVS: RRR


 Abd: Soft/Obese, BS+


 Ext: 1-2+ BLE edema, LUE AVF


 Psyche: Appropriate mood








- Vital Signs


Vital signs: 


 Vital Signs - 12hr











  12/07/17 12/07/17 12/07/17





  02:31 06:14 08:41


 


Temperature 97.5 F L 98.1 F 


 


Pulse Rate 82 75 


 


Pulse Rate [   85





Anterior   





Bilateral   





Throughout]   


 


Respiratory 20 20 





Rate   


 


Respiratory   19





Rate [Anterior   





Bilateral   





Throughout]   


 


Blood Pressure  89/45 


 


Blood Pressure 88/44  





[Right]   


 


O2 Sat by Pulse 94 94 





Oximetry   














  12/07/17





  08:42


 


Temperature 


 


Pulse Rate 


 


Pulse Rate [ 





Anterior 





Bilateral 





Throughout] 


 


Respiratory 





Rate 


 


Respiratory 





Rate [Anterior 





Bilateral 





Throughout] 


 


Blood Pressure 


 


Blood Pressure 





[Right] 


 


O2 Sat by Pulse 97





Oximetry 














- Lab





 12/07/17 04:13





 12/06/17 23:48


 Most recent lab results











Calcium  8.2 mg/dL (8.4-10.2)  L D 12/06/17  23:48

## 2017-12-07 NOTE — PROGRESS NOTE
Assessment and Plan


Assessment and plan: 


51 YO Female Nursing Home Resident with MO,CVA with LHP, MI, PE, CHF, DM, COPD, 

ESRD on HD, Chronic Respiratory Failure on 3L O2, ROQUE presents to ED for 

evaluation. Pt states that she has experienced pain in her chest for the past 1 

day with worsening symptoms over the past 5 hours. Pt states that the pain is 3-

6/10, crushing in nature, localized to the right lateral chest, nonradiating, 

intermittent, worse with coughing and deep breathing, as well as palpation, not 

worsened with exertion, or relieved with rest. Pt also complains of Right leg/

calf swelling over the past week. Pt states that she had undergone dialysis for 

approximately 1.5 hours and experienced a drop in her blood pressure which 

caused her to have concern. Pt also acknowledges concomitant productive cough 

of whitish sputum while at the nursing home. Patient has been treated with oral 

Levaquin 750mg daily x 7 days for suspected pneumonia.  Pt denies fever, chills

, Palpitations, NVD, Syncope, BRBPR, Recent ill contacts. Pt seen and evaluated 

in ED and found to have symptoms consistent with ACS as well as possible PE. Pt 

initiated on empiric therapeutic anticoagulation, and cardiology team 

consulted. 








Acute on chronic Respiratory failure


Morbid obesity


Presumed obesity hyperventilation syndrome


Acute on chronic congestive heart failure


ESRD


Diabetes mellitus


Severe hypokalemia- ERROR. NOW CORRECTED ON REPEAT LABS THIS AM


Type 2 MI secondary to ESRD





PLAN


* Supportive care


* Cardiology and Nephrology input noted, 


* Unable to obtain CTA OR V/Q AND STRESS TEST due to weight, patient refused 

dOPPLER and understands associated risk for possible PE, DVT with ultimate 

consequence not limited to death and disability secondary to CVA


* conservative managment per cardiology


* BIPAP as needed


* Dily weight and I/O


* Echo pending


* obtain record from [facility]


* Accucheck AC/HS, insulin sliding scale


* DVT/GI prophy


*   








History


Interval history: 


patient seen and examined today reports improvement in shortness of breath and 

chest pain. Denies any fever, nausea, vomiting or diarrhea. 





Hospitalist Physical





- Physical exam


Narrative exam: 


General appearance: Present: no acute distress, well-nourished, obese (morbidly)





- EENT


Eyes: Present: PERRL, irregular pupil





- Neck


Neck: Present: supple, normal ROM





- Respiratory


Respiratory effort: normal


Respiratory: bilateral: diminished





- Cardiovascular


Rhythm: regular


Heart Sounds: Present: S1 & S2.  Absent: systolic murmur, diastolic murmur





- Extremities


Extremities: no ischemia, pulses intact, pulses symmetrical, Full ROM


Extremity abnormal: edema (+1)


Peripheral Pulses: within normal limits





- Abdominal


General gastrointestinal: soft, non-tender, non-distended, normal bowel sounds, 

other (nlarged panus)





- Integumentary


Integumentary: Present: clear, warm, dry





- Psychiatric


Psychiatric: appropriate mood/affect, intact judgment & insight, cooperative





- Neurologic


Neurologic: CNII-XII intact











- Constitutional


Vitals: 


 











Temp Pulse Resp BP Pulse Ox


 


 98.3 F   79   18   112/59   97 


 


 12/07/17 11:20  12/07/17 13:30  12/07/17 11:20  12/07/17 13:30  12/07/17 08:42











General appearance: Present: no acute distress





Results





- Labs


CBC & Chem 7: 


 12/07/17 04:13





 12/06/17 23:48


Labs: 


 Laboratory Last Values











WBC  8.2 K/mm3 (4.5-11.0)   12/05/17  15:56    


 


RBC  3.39 M/mm3 (3.65-5.03)  L  12/05/17  15:56    


 


Hgb  10.0 gm/dl (10.1-14.3)  L  12/07/17  04:13    


 


Hct  30.9 % (30.3-42.9)   12/07/17  04:13    


 


MCV  95 fl (79-97)   12/05/17  15:56    


 


MCH  30 pg (28-32)   12/05/17  15:56    


 


MCHC  31 % (30-34)   12/05/17  15:56    


 


RDW  15.5 % (13.2-15.2)  H  12/05/17  15:56    


 


Plt Count  173 K/mm3 (140-440)   12/07/17  04:13    


 


Lymph % (Auto)  23.9 % (13.4-35.0)   12/05/17  15:56    


 


Mono % (Auto)  9.7 % (0.0-7.3)  H  12/05/17  15:56    


 


Eos % (Auto)  3.6 % (0.0-4.3)   12/05/17  15:56    


 


Baso % (Auto)  0.5 % (0.0-1.8)   12/05/17  15:56    


 


Lymph #  2.0 K/mm3 (1.2-5.4)   12/05/17  15:56    


 


Mono #  0.8 K/mm3 (0.0-0.8)   12/05/17  15:56    


 


Eos #  0.3 K/mm3 (0.0-0.4)   12/05/17  15:56    


 


Baso #  0.0 K/mm3 (0.0-0.1)   12/05/17  15:56    


 


Seg Neutrophils %  62.3 % (40.0-70.0)   12/05/17  15:56    


 


Seg Neutrophils #  5.1 K/mm3 (1.8-7.7)   12/05/17  15:56    


 


PT  15.9 Sec. (12.2-14.9)  H  12/05/17  17:29    


 


INR  1.20  (0.87-1.13)  H  12/05/17  17:29    


 


APTT  102.6 Sec. (24.2-36.6)  H*  12/05/17  17:29    


 


D-Dimer  494.09 ng/mlDDU (0-234)  H  12/05/17  17:29    


 


Sodium  143 mmol/L (137-145)   12/06/17  23:48    


 


Potassium  4.1 mmol/L (3.6-5.0)  D 12/06/17  23:48    


 


Chloride  98.6 mmol/L ()   12/06/17  23:48    


 


Carbon Dioxide  31 mmol/L (22-30)  H  12/06/17  23:48    


 


Anion Gap  18 mmol/L  12/06/17  23:48    


 


BUN  14 mg/dL (7-17)   12/06/17  23:48    


 


Creatinine  5.5 mg/dL (0.7-1.2)  H D 12/06/17  23:48    


 


Estimated GFR  8 ml/min  12/06/17  23:48    


 


BUN/Creatinine Ratio  3 %  12/06/17  23:48    


 


Glucose  153 mg/dL ()  H  12/06/17  23:48    


 


Calcium  8.2 mg/dL (8.4-10.2)  L D 12/06/17  23:48    


 


Troponin T  0.320 ng/mL (0.00-0.029)  H*  12/06/17  12:03    


 


NT-Pro-B Natriuret Pep  5815 pg/mL (0-900)  H  12/05/17  15:56    


 


Triglycerides  190 mg/dL (2-149)  H  12/05/17  15:56    


 


Cholesterol  173 mg/dL ()   12/05/17  15:56    


 


LDL Cholesterol Direct  101 mg/dL ()   12/05/17  15:56    


 


HDL Cholesterol  34 mg/dL (40-59)  L  12/05/17  15:56    


 


Cholesterol/HDL Ratio  5.08 %  12/05/17  15:56    


 


Hepatitis A IgM Ab  Non-reactive  (NonReactive)   12/06/17  12:03    


 


Hep Bs Antigen  Non-reactive  (Negative)   12/06/17  12:03    


 


Hep B Core IgM Ab  Non-reactive  (NonReactive)   12/06/17  12:03    


 


Hepatitis C Antibody  Non-reactive  (NonReactive)   12/06/17  12:03

## 2017-12-08 VITALS — SYSTOLIC BLOOD PRESSURE: 77 MMHG | DIASTOLIC BLOOD PRESSURE: 28 MMHG

## 2017-12-08 LAB
ANION GAP SERPL CALC-SCNC: 19 MMOL/L
ANION GAP SERPL CALC-SCNC: 19 MMOL/L
BUN SERPL-MCNC: 14 MG/DL (ref 7–17)
BUN SERPL-MCNC: 18 MG/DL (ref 7–17)
BUN/CREAT SERPL: 3 %
BUN/CREAT SERPL: 3 %
CALCIUM SERPL-MCNC: 8.7 MG/DL (ref 8.4–10.2)
CALCIUM SERPL-MCNC: 8.8 MG/DL (ref 8.4–10.2)
CHLORIDE SERPL-SCNC: 99.4 MMOL/L (ref 98–107)
CHLORIDE SERPL-SCNC: 99.9 MMOL/L (ref 98–107)
CO2 SERPL-SCNC: 27 MMOL/L (ref 22–30)
CO2 SERPL-SCNC: 28 MMOL/L (ref 22–30)
GLUCOSE SERPL-MCNC: 147 MG/DL (ref 65–100)
GLUCOSE SERPL-MCNC: 172 MG/DL (ref 65–100)
POTASSIUM SERPL-SCNC: 4.2 MMOL/L (ref 3.6–5)
POTASSIUM SERPL-SCNC: 4.2 MMOL/L (ref 3.6–5)
SODIUM SERPL-SCNC: 142 MMOL/L (ref 137–145)
SODIUM SERPL-SCNC: 142 MMOL/L (ref 137–145)

## 2017-12-08 RX ADMIN — CALCIUM ACETATE SCH: 667 CAPSULE ORAL at 16:41

## 2017-12-08 RX ADMIN — ALBUTEROL SULFATE SCH: 2.5 SOLUTION RESPIRATORY (INHALATION) at 07:49

## 2017-12-08 RX ADMIN — BUDESONIDE SCH MG: 0.5 INHALANT RESPIRATORY (INHALATION) at 07:49

## 2017-12-08 RX ADMIN — ASPIRIN SCH MG: 81 TABLET, COATED ORAL at 11:26

## 2017-12-08 RX ADMIN — CARVEDILOL SCH: 6.25 TABLET, FILM COATED ORAL at 11:29

## 2017-12-08 RX ADMIN — CALCIUM ACETATE SCH MG: 667 CAPSULE ORAL at 11:24

## 2017-12-08 RX ADMIN — LOSARTAN POTASSIUM SCH: 50 TABLET, FILM COATED ORAL at 11:29

## 2017-12-08 RX ADMIN — ACETAMINOPHEN PRN MG: 325 TABLET ORAL at 05:15

## 2017-12-08 RX ADMIN — TORSEMIDE SCH: 10 TABLET ORAL at 11:29

## 2017-12-08 RX ADMIN — CALCIUM ACETATE SCH MG: 667 CAPSULE ORAL at 16:47

## 2017-12-08 RX ADMIN — MAGNESIUM HYDROXIDE PRN ML: 400 SUSPENSION ORAL at 11:24

## 2017-12-08 RX ADMIN — HEPARIN SODIUM SCH UNIT: 5000 INJECTION, SOLUTION INTRAVENOUS; SUBCUTANEOUS at 11:27

## 2017-12-08 RX ADMIN — ARFORMOTEROL TARTRATE SCH MCG: 15 SOLUTION RESPIRATORY (INHALATION) at 07:49

## 2017-12-08 RX ADMIN — FAMOTIDINE SCH MG: 10 TABLET ORAL at 11:26

## 2017-12-08 NOTE — DISCHARGE SUMMARY
Providers





- Providers


Date of Admission: 


12/05/17 20:54





Attending physician: 


CRISTAL WALDRON MD





 





12/05/17


Consult to Cardiac Rehabilitation [CONS] Routine 


   Reason For Exam: Phase I





12/05/17 19:58


Consult to Physician [CONS] Urgent 


   Consulting Provider: MANJULA HANDLEY


   Reason For Exam: esrd, pulm edema


   Place consult to:: Dr. Handley


   Notified:: Answering Service


   Phone number called:: [phone]


   Was contact made?: Yes


   If yes, spoke with:: Dr. Handley


   Time called:: 19:56


   Comment:: Dr. Magaña (er dr) spoke with Dr. Handley











Primary care physician: 


PRIMARY CARE MD








Hospitalization


Reason for admission: chest pain


Condition: Stable


Hospital course: 


53 YO Female Nursing Home Resident with MO,CVA with LHP, MI, PE, CHF, DM, COPD, 

ESRD on HD, Chronic Respiratory Failure on 3L O2, ROQUE presents to ED for 

evaluation. Pt states that she has experienced pain in her chest for the past 1 

day with worsening symptoms over the past 5 hours. Pt states that the pain is 3-

6/10, crushing in nature, localized to the right lateral chest, nonradiating, 

intermittent, worse with coughing and deep breathing, as well as palpation, not 

worsened with exertion, or relieved with rest. Pt also complains of Right leg/

calf swelling over the past week. Pt states that she had undergone dialysis for 

approximately 1.5 hours and experienced a drop in her blood pressure which 

caused her to have concern. Pt also acknowledges concomitant productive cough 

of whitish sputum while at the nursing home. Patient has been treated with oral 

Levaquin 750mg daily x 7 days for suspected pneumonia.  Pt denies fever, chills

, Palpitations, NVD, Syncope, BRBPR, Recent ill contacts. Pt seen and evaluated 

in ED and found to have symptoms consistent with ACS as well as possible PE. Pt 

initiated on empiric therapeutic anticoagulation, and cardiology team 

consulted.  Symptoms improved with dialysis.  Patient unfortunately could not 

have stress test or VT or CTA done due to weightlifting limitation.  The 

patient refused a Doppler ultrasound of the lower extremities which could've 

help to determine if there is a DVT this was explained to the patient in 

addition to the risk associated the patient refused.  Patient reported that she 

has problem with constipation I did advise about gotten out of her opioid 

medications.  She received an enema.  She was noted to be hypotensive which she 

reports is chronic for her.  I did cut down her losartan to 50 twice a day.  I 

recommend blood pressure monitoring.  She is stable at this point for discharge 

she also refused further workup by cardiology and the recommended conservative 

management.  Note that the hypokalemia was an error and was noted corrected on 

repeat lab without administering of any potassium.  Weight loss counseling was 

provided.








Acute on chronic Respiratory failure


Morbid obesity


Presumed obesity hyperventilation syndrome


Acute on chronic congestive heart failure


ESRD


Diabetes mellitus


Severe hypokalemia- ERROR. NOW CORRECTED ON REPEAT LABS THIS AM


Type 2 MI secondary to ESRD








Disposition: DC/[facility] SNF [facility]


Time spent for discharge: 35 mins





Core Measure Documentation





- Palliative Care


Palliative Care/ Comfort Measures: Not Applicable





- Core Measures


Any of the following diagnoses?: none





- VTE Discharge Requirements


Deep Vein Thrombosis/Pulmonary Embolism Present on Admission: No





- Heart Failure Discharge Requirements


ACE/ARB for LVSD if EF <40%: Yes


Beta blocker at discharge: Yes





Exam





- Physical Exam


Narrative exam: 


General appearance: Present: no acute distress, well-nourished, obese (morbidly)





- EENT


Eyes: Present: PERRL, irregular pupil





- Neck


Neck: Present: supple, normal ROM





- Respiratory


Respiratory effort: normal


Respiratory: bilateral: diminished





- Cardiovascular


Rhythm: regular


Heart Sounds: Present: S1 & S2.  Absent: systolic murmur, diastolic murmur





- Extremities


Extremities: no ischemia, pulses intact, pulses symmetrical, Full ROM


Extremity abnormal: edema (+1)


Peripheral Pulses: within normal limits





- Abdominal


General gastrointestinal: soft, non-tender, non-distended, normal bowel sounds, 

other (nlarged panus)





- Integumentary


Integumentary: Present: clear, warm, dry





- Psychiatric


Psychiatric: appropriate mood/affect, intact judgment & insight, cooperative





- Neurologic


Neurologic: CNII-XII intact











- Constitutional


Vitals: 


 











Temp Pulse Resp BP Pulse Ox


 


 97.4 F L  69   20   80/43   95 


 


 12/08/17 05:45  12/08/17 08:13  12/08/17 08:13  12/08/17 05:45  12/08/17 07:44














Plan


Activity: advance as tolerated, fall precautions


Diet: renal


Special Instructions: record daily BP diary


Follow up with: 


PRIMARY CARE,MD [Primary Care Provider] - 7 Days


ZENON HARDWICK MD [Staff Physician] - 7 Days


MANJULA HANDLEY MD [Staff Physician] - 7 Days


Prescriptions: 


Bisacodyl [Dulcolax suppos] 10 mg DC QDAY PRN #30 supp.rect


 PRN Reason: Constipation unrelieved by MOM


Losartan [Cozaar] 50 mg PO QDAY #30 tablet


Oxycodone HCl/Acetaminophen [Percocet 7.5/325 mg] 1 each PO Q8HR PRN #7 tablet


 PRN Reason: Pain

## 2017-12-08 NOTE — PROGRESS NOTE
Assessment and Plan


ESRD on hemodialysis:


Volume overload improved.


-s/p HD yesterday, no HD today. Will eval need daily.


-Renally dose all meds


-Check BMP/Mg/Phos daily





Suspected PE:


-VQ Scan/CTPE could not be done due to morbid obesity


-Per primary.





Acute coronary syndrome:


Chest Pain:


-Cards consulted, per them


-Has h/o CHF per notes





Diabetes mellitus type 2 on insulin:


-On ISS


-Per primary





Anemia of chronic disease due to ESRD:


-Epogen to keep Hg 10-12





Bone Metabolic disease:


-Continue phos binder





Morbid obesity:


Obstructive Sleep apnea:


-Counselled to loose wt


-CPAP per primary








Vahe Handley MD


Nephrology, Hypertension, Dialysis, Transplantation


Phone no: [phone]











Subjective


Date of service: 12/08/17


Interval history: 


Denies CP/SHOB. s/p HD yesterday.





Objective





- Exam


Narrative Exam: 


GE: AAOX3, Obese lady


 HEENT: PERRLA


 Neck: Supple


 Chest: Coarse BS BL, Rt Cw tdc


 CVS: RRR


 Abd: Soft/Obese, BS+


 Ext: 1+ BLE edema, LUE AVF


 Psyche: Appropriate mood








- Vital Signs


Vital signs: 


 Vital Signs - 12hr











  12/07/17 12/07/17 12/08/17





  23:54 23:55 00:00


 


Temperature 98.3 F 98.3 F 97.5 F L


 


Pulse Rate  76 76


 


Pulse Rate [   





Anterior   





Bilateral   





Throughout]   


 


Respiratory 22  18





Rate   


 


Respiratory   





Rate [Anterior   





Bilateral   





Throughout]   


 


Blood Pressure 70/35  


 


Blood Pressure   70/30





[Right]   


 


O2 Sat by Pulse   96





Oximetry   














  12/08/17 12/08/17 12/08/17





  01:15 01:18 05:45


 


Temperature   97.4 F L


 


Pulse Rate  80 69


 


Pulse Rate [   





Anterior   





Bilateral   





Throughout]   


 


Respiratory   20





Rate   


 


Respiratory   





Rate [Anterior   





Bilateral   





Throughout]   


 


Blood Pressure   


 


Blood Pressure 83/36 100/60 80/43





[Right]   


 


O2 Sat by Pulse   95





Oximetry   














  12/08/17 12/08/17





  07:44 08:13


 


Temperature  


 


Pulse Rate  


 


Pulse Rate [ 74 69





Anterior  





Bilateral  





Throughout]  


 


Respiratory  





Rate  


 


Respiratory 18 20





Rate [Anterior  





Bilateral  





Throughout]  


 


Blood Pressure  


 


Blood Pressure  





[Right]  


 


O2 Sat by Pulse 95 





Oximetry  














- Lab





 12/07/17 04:13





 12/08/17 12:42


 Most recent lab results











Calcium  8.7 mg/dL (8.4-10.2)   12/07/17  23:56

## 2018-02-12 ENCOUNTER — HOSPITAL ENCOUNTER (OUTPATIENT)
Dept: HOSPITAL 5 - OR | Age: 53
Discharge: NURSING, REHAB OR LONG-TERM CARE | End: 2018-02-12
Attending: SURGERY
Payer: MEDICARE

## 2018-02-12 VITALS — DIASTOLIC BLOOD PRESSURE: 73 MMHG | SYSTOLIC BLOOD PRESSURE: 110 MMHG

## 2018-02-12 DIAGNOSIS — I12.0: Primary | ICD-10-CM

## 2018-02-12 DIAGNOSIS — N18.6: ICD-10-CM

## 2018-02-12 DIAGNOSIS — I25.2: ICD-10-CM

## 2018-02-12 DIAGNOSIS — J44.9: ICD-10-CM

## 2018-02-12 DIAGNOSIS — E66.9: ICD-10-CM

## 2018-02-12 DIAGNOSIS — Z88.5: ICD-10-CM

## 2018-02-12 DIAGNOSIS — Z79.899: ICD-10-CM

## 2018-02-12 DIAGNOSIS — E11.22: ICD-10-CM

## 2018-02-12 DIAGNOSIS — I69.954: ICD-10-CM

## 2018-02-12 DIAGNOSIS — Z88.1: ICD-10-CM

## 2018-02-12 DIAGNOSIS — Z88.8: ICD-10-CM

## 2018-02-12 DIAGNOSIS — I25.10: ICD-10-CM

## 2018-02-12 LAB
BASOPHILS # (AUTO): 0 K/MM3 (ref 0–0.1)
BASOPHILS NFR BLD AUTO: 0.7 % (ref 0–1.8)
BUN SERPL-MCNC: 39 MG/DL (ref 7–17)
BUN/CREAT SERPL: 5 %
CALCIUM SERPL-MCNC: 8.9 MG/DL (ref 8.4–10.2)
EOSINOPHIL # BLD AUTO: 0.4 K/MM3 (ref 0–0.4)
EOSINOPHIL NFR BLD AUTO: 5.5 % (ref 0–4.3)
HCT VFR BLD CALC: 38.6 % (ref 30.3–42.9)
HEMOLYSIS INDEX: 6
HGB BLD-MCNC: 12.3 GM/DL (ref 10.1–14.3)
LYMPHOCYTES # BLD AUTO: 2 K/MM3 (ref 1.2–5.4)
LYMPHOCYTES NFR BLD AUTO: 29.5 % (ref 13.4–35)
MCH RBC QN AUTO: 30 PG (ref 28–32)
MCHC RBC AUTO-ENTMCNC: 32 % (ref 30–34)
MCV RBC AUTO: 94 FL (ref 79–97)
MONOCYTES # (AUTO): 0.8 K/MM3 (ref 0–0.8)
MONOCYTES % (AUTO): 11.8 % (ref 0–7.3)
PLATELET # BLD: 184 K/MM3 (ref 140–440)
RBC # BLD AUTO: 4.11 M/MM3 (ref 3.65–5.03)

## 2018-02-12 PROCEDURE — 36415 COLL VENOUS BLD VENIPUNCTURE: CPT

## 2018-02-12 PROCEDURE — 80048 BASIC METABOLIC PNL TOTAL CA: CPT

## 2018-02-12 PROCEDURE — 85025 COMPLETE CBC W/AUTO DIFF WBC: CPT

## 2018-02-12 PROCEDURE — 36830 ARTERY-VEIN NONAUTOGRAFT: CPT

## 2018-02-12 PROCEDURE — 82962 GLUCOSE BLOOD TEST: CPT

## 2018-02-12 PROCEDURE — C1768 GRAFT, VASCULAR: HCPCS

## 2018-02-12 NOTE — ANESTHESIA CONSULTATION
Anesthesia Consult and Med Hx


Date of service: 02/12/18





- Airway


Anesthetic Teeth Evaluation: Poor (missing)


ROM Head & Neck: Inadequate


Mental/Hyoid Distance: Inadequate


Mallampati Class: Class IV


Intubation Access Assessment: Possibly Difficult





- Pulmonary Exam


CTA: Yes





- Cardiac Exam


Cardiac Exam: RRR





- Pre-Operative Health Status


ASA Pre-Surgery Classification: ASA4


Proposed Anesthetic Plan: General, Local, MAC


Nerve Block: brachial plexus block





- Pulmonary


COPD: Yes


Home Oxygen Therapy: Yes (on 3 L o2)


Hx Pneumonia: Yes


Hx Sleep Apnea: Yes





- Cardiovascular System


Hx Hypertension: Yes


Hx Coronary Artery Disease: Yes


Hx Heart Attack/AMI: Yes





- Central Nervous System


CVA: Yes (Left side weak)





- Endocrine


Hx Renal Disease: Yes


Hx End Stage Renal Disease: Yes





- Other Systems


Hx Obesity: Yes





- Additional Comments


Anesthesia Medical History Comments: no pulmonary consult note.  left sided 

weakness, COPD on home O2.  poor candidate for brachial plexus block. high risk 

for GA.  Will check with surgeon to do local with minimal sedation

## 2018-02-12 NOTE — ANESTHESIA DAY OF SURGERY
Anesthesia Day of Surgery





- Day of Surgery


Patient Examined: Yes


Patient H&P Reviewed: Yes


Patient is NPO: Yes


Beta Blockers: No (not given at nursing home. HR 66. /52)

## 2018-02-12 NOTE — OPERATIVE REPORT
Operative Report


Operative Report: 





Date of procedure: 02/12/2018





Pre-operative diagnosis: End-Stage Renal Disease





Post-operative diagnosis: Same





Procedure(s):


1.  Creation of Left Brachial Artery to Axillary Vein AV Graft with 6 mm Bovine 

Graft





Surgeon: Myles Martel MD





Assistant: None





Anesthesia: Local/MAC





EBL: Minimal





Counts: Correct





Complications: None





Condition: Stable





Findings: Successful Creation of Left Arm AV Graft   





Specimen: None





Indication: 





The patient is a 51 yo female with a history of ESRD on hemodialysis through a 

right internal jugular permacath.  She is in need of long term dialysis access 

and is suitable candidate for an AV graft.  She was given the risk, benefits, 

and alternative procedures, and consented to the procedure.





Description of Procedure:  





The patient was brought to the operating room and laid in supine position after 

general endotracheal anesthesia was achieved the left arm was prepped and 

draped in normal sterile fashion.  A longitudinal incision was made on the 

medial aspect of the arm just proximal to the antecubital crease and carried 

down to the brachial artery using sharp dissection.  The brachial artery was 

dissected out circumferentially both proximally and distally and controlled 

with vessel loops.  A second incision was created in longitudinal fashion on 

the medial aspect of the arm just distal to the axillary crease and carried 

down to the axillary vein using sharp dissection.  Axillary vein was dissected 

out circumferentially and controlled with a vessel loop.  I then used a Lois-

Wick tunneler to tunnel from the brachial artery incision to the axillary vein 

incision and then put an 6 mm bovine through the tunnel.  I infused with 

heparinized saline to ensure that it was not twisted or kinked.  I put the 

brachial artery vessel loops on tension controlling the flow and then created 

an arteriotomy using an 11 blade and Mcgregor scissors.  I beveled the graft and 

created an end-to-side anastomosis using 6-0 Prolene running fashion.  I 

clamped the graft just proximal to the anastomosis and then released the vessel 

loops restoring flow in the brachial artery.  I placed quick clot in incision 

to achieve hemostasis.  I cut the proximal end of the graft to the appropriate 

length and beveled the graft in preparation for a venous anastomosis.  I 

controlled the axillary vein a Satinsky clamp and created a venotomy using an 

11 blade and Mcgregor scissors.  I created an end to side anastomosis using a 6-0 

Prolene in running fashion.  Prior to completing the anastomosis I flushed the 

graft to ensure there was no thrombus and then completed the anastamosis.  I 

released all clamps allowing flow into the AV graft which had an excellent 

thrill.  I packed the wound with quick clot to achieve hemostasis.  I 

anesthetized both wounds with Marcaine and then closed both wounds in 2 layers 

using 3-0 Vicryl in running fashion in the deep dermal layer and 4-0 Monocryl 

in running fashion the subcuticular layer.  I dressed both wounds with 

Surgicel.  The patient tolerated the procedure well all sponge needle and 

instrument counts were correct the patient was taken to recovery in stable 

condition.

## 2018-02-12 NOTE — SHORT STAY SUMMARY
Short Stay Documentation


Date of service: 02/12/18


Narrative H&P: 





See H&P





- History


H&P: obtained from office





- Allergies and Medications


Current Medications: 


 Allergies





amoxicillin Allergy (Verified 02/08/18 15:20)


 Unknown


codeine Allergy (Verified 02/08/18 15:20)


 Unknown


potassium chloride Allergy (Verified 02/08/18 15:20)


 Unknown


prochlorperazine [From Compazine] Allergy (Verified 02/08/18 15:20)


 Unknown





 Home Medications











 Medication  Instructions  Recorded  Confirmed  Last Taken  Type


 


Aspirin [Adult Low Dose Aspirin EC] 81 mg PO QDAY 12/05/17 02/08/18 Unknown 

History


 


Calcium Acetate [Phoslo] 2 cap PO TIDWM 12/05/17 02/08/18 Unknown History


 


Carvedilol [Coreg] 6.25 mg PO BID 12/05/17 02/08/18 Unknown History


 


Fluticasone/Salmeterol [Advair 1 puff IH BID 12/05/17 02/08/18 Unknown History





250-50 Diskus]     


 


Insulin Aspart [NovoLOG Flexpen] See Protocol SQ AC 12/05/17 02/08/18 Unknown 

History


 


Magnesium Hydroxide [Milk of 30 ml PO HS 12/05/17 02/08/18 Unknown History





Magnesia]     


 


Nystatin Oint [Mycostatin Oint] 1 applicatio TP BID 12/05/17 02/08/18 Unknown 

History


 


Polyethylene Glycol 3350 [Miralax 17 gm PO QDAY PRN 12/05/17 02/08/18 Unknown 

History





3350]     


 


Torsemide [Demadex] 20 mg PO QDAY 12/05/17 02/08/18 Unknown History


 


Bisacodyl [Dulcolax suppos] 10 mg NH QDAY PRN #30 supp.rect 12/08/17 02/08/18 

Unknown Rx


 


Losartan [Cozaar] 50 mg PO QDAY #30 tablet 12/08/17 02/08/18 Unknown Rx


 


Oxycodone HCl/Acetaminophen 1 each PO Q8HR PRN #7 tablet 12/08/17 02/08/18 

Unknown Rx





[Percocet 7.5/325 mg]     


 


Budesonide [Pulmicort] 0.5 mg IH Q12HR 02/08/18 02/08/18 Unknown History


 


Ipratropium/Albuterol Sulfate 1 ampul IH Q6HR 02/08/18 02/08/18 Unknown History





[DUONEB *Not for PRN Use*]     








Active Medications





Sodium Chloride (Nacl 0.9% 1000 Ml)  1,000 mls @ 42 mls/hr IV AS DIRECT MICHAEL


   Last Admin: 02/12/18 07:20 Dose:  42 mls/hr


Vancomycin HCl (Vancomycin/0.45 Ns 1 Gm/250 Ml)  1 gm in 250 mls @ 167.007 mls/

hr IV PREOP MICHAEL


   Last Admin: 02/12/18 07:32 Dose:  167.007 mls/hr











- Brief post op/procedure progress note


Date of procedure: 02/12/18


Pre-op diagnosis: ESRD


Post-op diagnosis: same


Procedure: 





Creation of Left Arm AV Graft with 6 mm Bovine Graft


Anesthesia: MAC, local


Surgeon: ANASTASIYA GALEANO


Estimated blood loss: minimal


Pathology: none


Condition: stable





- Disposition


Condition at discharge: Good


Disposition: DC/[place]-[facility] SNF [facility]





Short Stay Discharge Plan


Activity: other (no heavy lifting with left arm)


Wound: open to air, keep clean and dry, other (okay to wash the wound with soap 

and water but do not soak in water)


Special Instructions: other (no blood pressure or iv in left arm)


Follow up with: 


ANASTASIYA GALEANO MD [Staff Physician] - 14 Days


Prescriptions: 


HYDROcodone/APAP 7.5-325 [[place] 7.5/325] 1 each PO Q6HR PRN #40 tablet


 PRN Reason: Pain

## 2018-03-06 ENCOUNTER — HOSPITAL ENCOUNTER (EMERGENCY)
Dept: HOSPITAL 5 - ED | Age: 53
Discharge: OTHER INSTITUTION | End: 2018-03-06
Payer: MEDICARE

## 2018-03-06 VITALS — DIASTOLIC BLOOD PRESSURE: 76 MMHG | SYSTOLIC BLOOD PRESSURE: 137 MMHG

## 2018-03-06 DIAGNOSIS — E11.22: ICD-10-CM

## 2018-03-06 DIAGNOSIS — J44.9: ICD-10-CM
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
## 2018-03-06 DIAGNOSIS — N18.6: ICD-10-CM
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
## 2018-03-06 DIAGNOSIS — G47.30: ICD-10-CM

## 2018-03-06 DIAGNOSIS — I13.2: ICD-10-CM
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
## 2018-03-06 DIAGNOSIS — S19.9XXA: Primary | ICD-10-CM

## 2018-03-06 DIAGNOSIS — Z99.2: ICD-10-CM

## 2018-03-06 DIAGNOSIS — S29.9XXA: ICD-10-CM

## 2018-03-06 PROCEDURE — 96372 THER/PROPH/DIAG INJ SC/IM: CPT

## 2018-03-06 PROCEDURE — 99283 EMERGENCY DEPT VISIT LOW MDM: CPT

## 2018-03-06 NOTE — EMERGENCY DEPARTMENT REPORT
ED Fall HPI





- General


Chief Complaint: Fall


Stated Complaint: FALL


Time Seen by Provider: 03/06/18 16:37


Source: EMS


Mode of arrival: Stretcher





- History of Present Illness


Initial Comments: 





Patient is 52 years old female morbidly obese history of end stage renal 

disease on dialysis.  Patient brought to the ER by EMS for evaluation of a fall 

after she finished her dialysis this afternoon.  Patient was transferring from 

dialysis chair to the stretcher when she fell on her right side.  Patient is 

complaining of right hip pain, back pain, chest pain, abdominal pain, neck 

pain.  Patient does not know if she passed out or not.


MD Complaint: fall


-: Sudden


Fall From: wheelchair


When Fall Occurred: just prior to arrival


Fall Witnessed: no


Place Fall Occurred: other (dialysis Center)


Loss of Consciousness: none


Prolonged Down Time?: no


Location: head, neck, chest, back, abdomen, pelvis


Quality: sharp


Associated Symptoms: denies, abdominal pain.  denies: numbness, weakness, chest 

paint, shortness of breath, hematuria





- Related Data


 Home Medications











 Medication  Instructions  Recorded  Confirmed  Last Taken


 


Aspirin [Adult Low Dose Aspirin EC] 81 mg PO QDAY 12/05/17 02/08/18 02/11/18


 


Calcium Acetate [Phoslo] 2 cap PO TIDWM 12/05/17 02/08/18 02/11/18


 


Carvedilol [Coreg] 6.25 mg PO BID 12/05/17 02/08/18 02/11/18


 


Fluticasone/Salmeterol [Advair 1 puff IH BID 12/05/17 02/08/18 02/11/18





250-50 Diskus]    


 


Insulin Aspart [NovoLOG Flexpen] See Protocol SQ AC 12/05/17 02/08/18 02/11/18


 


Magnesium Hydroxide [Milk of 30 ml PO HS 12/05/17 02/08/18 02/11/18





Magnesia]    


 


Nystatin Oint [Mycostatin Oint] 1 applicatio TP BID 12/05/17 02/08/18 02/11/18


 


Polyethylene Glycol 3350 [Miralax 17 gm PO QDAY PRN 12/05/17 02/08/18 02/11/18





3350]    


 


Torsemide [Demadex] 20 mg PO QDAY 12/05/17 02/08/18 02/11/18


 


Budesonide [Pulmicort Respules] 0.5 mg IH Q12HR 02/08/18 02/08/18 02/11/18


 


Ipratropium/Albuterol Sulfate 1 ampul IH Q6HR 02/08/18 02/08/18 02/11/18





[DUONEB *Not for PRN Use*]    








 Previous Rx's











 Medication  Instructions  Recorded  Last Taken  Type


 


Bisacodyl [Dulcolax suppos] 10 mg AL QDAY PRN #30 supp.rect 12/08/17 02/11/18 Rx


 


Losartan [Cozaar] 50 mg PO QDAY #30 tablet 12/08/17 02/11/18 Rx


 


Oxycodone HCl/Acetaminophen 1 each PO Q8HR PRN #7 tablet 12/08/17 02/11/18 Rx





[Percocet 7.5/325 mg]    


 


HYDROcodone/APAP 7.5-325 [Norco 1 each PO Q6HR PRN #40 tablet 02/12/18 Unknown 

Rx





7.5/325]    











 Allergies











Allergy/AdvReac Type Severity Reaction Status Date / Time


 


amoxicillin Allergy  Unknown Verified 02/08/18 15:20


 


codeine Allergy  Unknown Verified 02/08/18 15:20


 


potassium chloride Allergy  Unknown Verified 02/08/18 15:20


 


prochlorperazine Allergy  Unknown Verified 02/08/18 15:20





[From Compazine]     














ED Review of Systems


ROS: 


Stated complaint: FALL


Other details as noted in HPI





Comment: All other systems reviewed and negative


Constitutional: denies: chills, fever


Respiratory: denies: cough, shortness of breath, SOB with exertion, SOB at rest


Cardiovascular: chest pain


Gastrointestinal: abdominal pain.  denies: nausea, vomiting


Musculoskeletal: back pain.  denies: joint swelling, myalgia


Neurological: denies: headache, weakness, numbness, abnormal gait





ED Past Medical Hx





- Past Medical History


Hx Hypertension: Yes


Hx CVA: Yes (residual left-sided weakness)


Hx Heart Attack/AMI: Yes


Hx Congestive Heart Failure: Yes


Hx Diabetes: Yes


Hx Renal Disease: Yes


Hx COPD: Yes


Additional medical history: morbid obesity.  sleep apnea





- Social History


Smoking Status: Never Smoker


Substance Use Type: None





- Medications


Home Medications: 


 Home Medications











 Medication  Instructions  Recorded  Confirmed  Last Taken  Type


 


Aspirin [Adult Low Dose Aspirin EC] 81 mg PO QDAY 12/05/17 02/08/18 02/11/18 

History


 


Calcium Acetate [Phoslo] 2 cap PO TIDWM 12/05/17 02/08/18 02/11/18 History


 


Carvedilol [Coreg] 6.25 mg PO BID 12/05/17 02/08/18 02/11/18 History


 


Fluticasone/Salmeterol [Advair 1 puff IH BID 12/05/17 02/08/18 02/11/18 History





250-50 Diskus]     


 


Insulin Aspart [NovoLOG Flexpen] See Protocol SQ AC 12/05/17 02/08/18 02/11/18 

History


 


Magnesium Hydroxide [Milk of 30 ml PO HS 12/05/17 02/08/18 02/11/18 History





Magnesia]     


 


Nystatin Oint [Mycostatin Oint] 1 applicatio TP BID 12/05/17 02/08/18 02/11/18 

History


 


Polyethylene Glycol 3350 [Miralax 17 gm PO QDAY PRN 12/05/17 02/08/18 02/11/18 

History





3350]     


 


Torsemide [Demadex] 20 mg PO QDAY 12/05/17 02/08/18 02/11/18 History


 


Bisacodyl [Dulcolax suppos] 10 mg AL QDAY PRN #30 supp.rect 12/08/17 02/08/18 02 /11/18 Rx


 


Losartan [Cozaar] 50 mg PO QDAY #30 tablet 12/08/17 02/08/18 02/11/18 Rx


 


Oxycodone HCl/Acetaminophen 1 each PO Q8HR PRN #7 tablet 12/08/17 02/08/18 02/11 /18 Rx





[Percocet 7.5/325 mg]     


 


Budesonide [Pulmicort Respules] 0.5 mg IH Q12HR 02/08/18 02/08/18 02/11/18 

History


 


Ipratropium/Albuterol Sulfate 1 ampul IH Q6HR 02/08/18 02/08/18 02/11/18 History





[DUONEB *Not for PRN Use*]     


 


HYDROcodone/APAP 7.5-325 [Norco 1 each PO Q6HR PRN #40 tablet 02/12/18  Unknown 

Rx





7.5/325]     














ED Physical Exam





- General


Limitations: Physical Limitation


General appearance: alert





- Head


Head exam: Present: normocephalic, normal inspection





- Eye


Eye exam: Present: normal appearance, PERRL





- ENT


ENT exam: Present: normal exam, normal orophraynx, mucous membranes moist





- Neck


Neck exam: Present: normal inspection, full ROM





- Respiratory


Respiratory exam: Present: normal lung sounds bilaterally, chest wall 

tenderness.  Absent: respiratory distress, wheezes





- Cardiovascular


Cardiovascular Exam: Present: regular rate, normal rhythm, normal heart sounds





- GI/Abdominal


GI/Abdominal exam: Present: soft.  Absent: distended, tenderness, guarding, 

rebound





- Back Exam


Back exam: Present: normal inspection





- Neurological Exam


Neurological exam: Present: alert, oriented X3, CN II-XII intact





- Skin


Skin exam: Present: warm, dry, intact





ED Course


 Vital Signs











  03/06/18 03/06/18 03/06/18





  14:32 15:29 15:30


 


Temperature   


 


Pulse Rate   


 


Respiratory   





Rate   


 


Blood Pressure 134/78  122/65


 


Blood Pressure   





[Right]   


 


O2 Sat by Pulse  94 96





Oximetry   














  03/06/18 03/06/18 03/06/18





  15:36 15:38 15:40


 


Temperature  98.3 F 


 


Pulse Rate  74 


 


Respiratory  18 





Rate   


 


Blood Pressure 122/65 122/65 134/78


 


Blood Pressure   





[Right]   


 


O2 Sat by Pulse 97 96 95





Oximetry   














  03/06/18 03/06/18 03/06/18





  15:45 15:50 15:52


 


Temperature   


 


Pulse Rate   


 


Respiratory   





Rate   


 


Blood Pressure 137/77 137/77 137/77


 


Blood Pressure   





[Right]   


 


O2 Sat by Pulse 95 97 96





Oximetry   














  03/06/18 03/06/18 03/06/18





  15:54 15:56 16:00


 


Temperature 98.3 F  


 


Pulse Rate 74  


 


Respiratory 18  





Rate   


 


Blood Pressure  137/77 138/80


 


Blood Pressure 122/65  





[Right]   


 


O2 Sat by Pulse 96 97 92





Oximetry   














  03/06/18 03/06/18 03/06/18





  16:03 16:06 16:08


 


Temperature   


 


Pulse Rate   


 


Respiratory   





Rate   


 


Blood Pressure 138/80 138/80 138/80


 


Blood Pressure   





[Right]   


 


O2 Sat by Pulse 98 95 96





Oximetry   














  03/06/18 03/06/18 03/06/18





  16:10 16:12 16:15


 


Temperature   


 


Pulse Rate   


 


Respiratory   





Rate   


 


Blood Pressure 138/80 138/80 138/80


 


Blood Pressure   





[Right]   


 


O2 Sat by Pulse 93 97 96





Oximetry   














  03/06/18 03/06/18 03/06/18





  16:17 16:18 16:22


 


Temperature   


 


Pulse Rate   


 


Respiratory   





Rate   


 


Blood Pressure 138/80 138/80 138/80


 


Blood Pressure   





[Right]   


 


O2 Sat by Pulse 98 96 96





Oximetry   














  03/06/18 03/06/18 03/06/18





  16:24 16:26 16:28


 


Temperature   


 


Pulse Rate   


 


Respiratory   





Rate   


 


Blood Pressure 138/80 138/80 138/80


 


Blood Pressure   





[Right]   


 


O2 Sat by Pulse 94 97 94





Oximetry   














  03/06/18 03/06/18 03/06/18





  16:30 16:32 16:34


 


Temperature   


 


Pulse Rate   


 


Respiratory   





Rate   


 


Blood Pressure 140/65 140/65 140/65


 


Blood Pressure   





[Right]   


 


O2 Sat by Pulse 93 93 78 L





Oximetry   














  03/06/18 03/06/18 03/06/18





  16:40 16:42 16:44


 


Temperature   


 


Pulse Rate   


 


Respiratory   





Rate   


 


Blood Pressure 140/65 140/65 140/65


 


Blood Pressure   





[Right]   


 


O2 Sat by Pulse 88 95 93





Oximetry   














  03/06/18 03/06/18 03/06/18





  16:46 16:48 16:50


 


Temperature   


 


Pulse Rate   


 


Respiratory   





Rate   


 


Blood Pressure 139/73 139/73 139/73


 


Blood Pressure   





[Right]   


 


O2 Sat by Pulse 93 91 92





Oximetry   














  03/06/18 03/06/18 03/06/18





  16:52 16:54 16:56


 


Temperature   


 


Pulse Rate   


 


Respiratory   





Rate   


 


Blood Pressure 139/73 139/73 139/73


 


Blood Pressure   





[Right]   


 


O2 Sat by Pulse 93 93 94





Oximetry   














  03/06/18 03/06/18 03/06/18





  16:58 17:00 17:02


 


Temperature   


 


Pulse Rate   


 


Respiratory   





Rate   


 


Blood Pressure 139/73 137/76 139/73


 


Blood Pressure   





[Right]   


 


O2 Sat by Pulse 97 95 96





Oximetry   














  03/06/18 03/06/18 03/06/18





  17:05 17:06 18:02


 


Temperature   


 


Pulse Rate   


 


Respiratory   18





Rate   


 


Blood Pressure 137/76 137/76 


 


Blood Pressure   





[Right]   


 


O2 Sat by Pulse 96 86 95





Oximetry   














- Reevaluation(s)


Reevaluation #1: 





03/06/18 17:55


The patient was taken to CT scan but patient unable to fit into our scanner.  I 

called [facility] to transfer patient, Arun have a bigger CT scan 

and they can accommodate her weight.  I discussed was Dr. Luiz Christy from 

[place] [facility] and she accepted the patient to be transferred to [facility] ER.


Reevaluation #2: 





03/06/18 18:17


Patient insisted that backboard and c-collar being removed because is very 

uncomfortable for her.  I explained to the patient the importance and the need 

for this c-collar and backboard to protect her spinal cord, patient understood 

and she still wanted them to be removed.  She is alert oriented 3 and able to 

make sound decision.





Critical care attestation.: 


If time is entered above; I have spent that time in minutes in the direct care 

of this critically ill patient, excluding procedure time.








ED Disposition


Clinical Impression: 


 Fall, Morbid obesity, ESRD (end stage renal disease), Head injury, Neck injury

, Chest injury, Abdominal injury, Pelvic injury





Disposition: [id number] ANOTHER TYPE HLTHCARE


Is pt being admited?: No


Condition: Stable


Referrals: 


PRIMARY CARE,MD [Primary Care Provider] - 3-5 Days

## 2018-09-14 ENCOUNTER — HOSPITAL ENCOUNTER (OUTPATIENT)
Dept: HOSPITAL 5 - CATHLABREC | Age: 53
Discharge: HOME | End: 2018-09-14
Attending: RADIOLOGY
Payer: MEDICARE

## 2018-09-14 VITALS — SYSTOLIC BLOOD PRESSURE: 106 MMHG | DIASTOLIC BLOOD PRESSURE: 73 MMHG

## 2018-09-14 DIAGNOSIS — I25.10: ICD-10-CM

## 2018-09-14 DIAGNOSIS — T82.868A: Primary | ICD-10-CM

## 2018-09-14 DIAGNOSIS — M19.90: ICD-10-CM

## 2018-09-14 DIAGNOSIS — Z88.5: ICD-10-CM

## 2018-09-14 DIAGNOSIS — N18.6: ICD-10-CM

## 2018-09-14 DIAGNOSIS — Z90.49: ICD-10-CM

## 2018-09-14 DIAGNOSIS — Z79.899: ICD-10-CM

## 2018-09-14 DIAGNOSIS — Z98.890: ICD-10-CM

## 2018-09-14 DIAGNOSIS — G47.30: ICD-10-CM

## 2018-09-14 DIAGNOSIS — Z98.891: ICD-10-CM

## 2018-09-14 DIAGNOSIS — M10.9: ICD-10-CM

## 2018-09-14 DIAGNOSIS — E11.22: ICD-10-CM

## 2018-09-14 DIAGNOSIS — Z83.3: ICD-10-CM
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
## 2018-09-14 DIAGNOSIS — E66.01: ICD-10-CM

## 2018-09-14 DIAGNOSIS — Z80.8: ICD-10-CM

## 2018-09-14 DIAGNOSIS — I50.9: ICD-10-CM

## 2018-09-14 DIAGNOSIS — J44.9: ICD-10-CM

## 2018-09-14 DIAGNOSIS — I25.2: ICD-10-CM

## 2018-09-14 DIAGNOSIS — Z86.711: ICD-10-CM

## 2018-09-14 DIAGNOSIS — I13.2: ICD-10-CM

## 2018-09-14 DIAGNOSIS — Z88.8: ICD-10-CM

## 2018-09-14 DIAGNOSIS — Z86.73: ICD-10-CM

## 2018-09-14 DIAGNOSIS — Z82.49: ICD-10-CM

## 2018-09-14 DIAGNOSIS — I87.1: ICD-10-CM

## 2018-09-14 DIAGNOSIS — Z87.891: ICD-10-CM

## 2018-09-14 DIAGNOSIS — Z88.1: ICD-10-CM

## 2018-09-14 PROCEDURE — 84132 ASSAY OF SERUM POTASSIUM: CPT

## 2018-09-14 PROCEDURE — C1725 CATH, TRANSLUMIN NON-LASER: HCPCS

## 2018-09-14 PROCEDURE — C1769 GUIDE WIRE: HCPCS

## 2018-09-14 PROCEDURE — 76937 US GUIDE VASCULAR ACCESS: CPT

## 2018-09-14 PROCEDURE — 99157 MOD SED OTHER PHYS/QHP EA: CPT

## 2018-09-14 PROCEDURE — 36415 COLL VENOUS BLD VENIPUNCTURE: CPT

## 2018-09-14 PROCEDURE — C1894 INTRO/SHEATH, NON-LASER: HCPCS

## 2018-09-14 PROCEDURE — 36905 THRMBC/NFS DIALYSIS CIRCUIT: CPT

## 2018-09-14 PROCEDURE — C1751 CATH, INF, PER/CENT/MIDLINE: HCPCS

## 2018-09-14 PROCEDURE — C1757 CATH, THROMBECTOMY/EMBOLECT: HCPCS

## 2018-09-14 PROCEDURE — 36907 BALO ANGIOP CTR DIALYSIS SEG: CPT

## 2018-09-14 PROCEDURE — 99156 MOD SED OTH PHYS/QHP 5/>YRS: CPT

## 2018-09-14 RX ADMIN — LIDOCAINE HYDROCHLORIDE ONE ML: 20 INJECTION, SOLUTION INFILTRATION; PERINEURAL at 16:08

## 2018-09-14 RX ADMIN — FENTANYL CITRATE ONE MCG: 50 INJECTION, SOLUTION INTRAMUSCULAR; INTRAVENOUS at 16:58

## 2018-09-14 RX ADMIN — MIDAZOLAM ONE MG: 1 INJECTION INTRAMUSCULAR; INTRAVENOUS at 16:36

## 2018-09-14 RX ADMIN — MIDAZOLAM ONE MG: 1 INJECTION INTRAMUSCULAR; INTRAVENOUS at 16:58

## 2018-09-14 RX ADMIN — VANCOMYCIN HYDROCHLORIDE NR MLS/HR: 1 INJECTION, POWDER, LYOPHILIZED, FOR SOLUTION INTRAVENOUS at 14:41

## 2018-09-14 RX ADMIN — MIDAZOLAM ONE MG: 1 INJECTION INTRAMUSCULAR; INTRAVENOUS at 15:59

## 2018-09-14 RX ADMIN — LIDOCAINE HYDROCHLORIDE ONE ML: 20 INJECTION, SOLUTION INFILTRATION; PERINEURAL at 16:00

## 2018-09-14 RX ADMIN — VANCOMYCIN HYDROCHLORIDE NR MLS: 1 INJECTION, POWDER, LYOPHILIZED, FOR SOLUTION INTRAVENOUS at 15:50

## 2018-09-14 RX ADMIN — FENTANYL CITRATE ONE MCG: 50 INJECTION, SOLUTION INTRAMUSCULAR; INTRAVENOUS at 16:00

## 2018-09-14 NOTE — OPERATIVE REPORT
Operative Report


Operative Report: 


EXAM:


1.  Ultrasound guided access of the AV graft towards the venous limb.


2.  Selection of the left subclavian vein with venography


3.  Angioplasty of the SVC, and left innominate vein with 12 mm x 60 mm 

angioplasty balloon


4.  Infusion of 4 mg of TPA throughout the AV graft


5.  Angioplasty of the AV graft with an 8 mm x 80 mm angioplasty balloon


6.  Trerotola Mechanical thrombectomy of the AV graft


7.  Rain sweep of the AV graft from the venous sheath to the central veins


8.  Ultrasound guided access of the AV graft towards the arterial limb.


9.  Selection of the brachial artery in a retrograde fashion with angiography 

of the left upper extremity


10.  Rain sweep of the AV graft from the arterial anastomosis to the 

arterial sheath


11.  Balloon tamponade of the mid graft extravasation with balloon for 23 

minutes with resolution of extravasation


12.  Angioplasty of the perianastamotic porion of the graft with an 8 mm x 40 

mm angiooplasty balloon


13.  Angioplasty of the brachial artery with a 6 mm x 40 mm angioplasty balloon 

after selection of the brachial artery in an antegrade fashion





INDICATION: THROMBOSED LEFT ARM AV GRAFT WITH END-STAGE RENAL DISEASE. 





DATE: 9/14/18





MEDICATIONS: 


Please refer to nursing documentation for complete list of medications and 

heparin administration.


VERSED AND FENTANYL TITRATED TO MODERATE SEDATION.  THE PATIENT WAS MONITORED 

UNDER CONTINUOUS CARDIOPULMONARY MONITORING THROUGHOUT THE CASE.





COMPLICATIONS: NONE IMMEDIATE





: SHARON BHATT MD





PROCEDURE:


The procedure was discussed with the patient and the risks, benefits, and 

alternatives were discussed with the patient.  Informed consent was obtained.  

The patient was transported into the angiography suite in stable condition and 

placed on the angiographic table.  The right arm was assessed under real-time 

ultrasound which demonstrated a thrombosed left arm AV graft. The patient was 

prepped and draped in a sterile fashion.





Lidocaine was used to anesthetize the skin.  Under ultrasound guidance, the AV 

graft was punctured with the needle pointing towards the venous limb.  0.018 

inch wire was advanced through the needle and this was exchanged for a 

transitional dilator.  The inner dilator and wire were removed and a 0.035 inch 

Chen wire was advanced through the transitional dilator.  The dilator was 

exchanged for a 7 Maltese short sheath.  Angled catheter was advanced over the 

wire and the wire was advanced into the inferior vena cava under fluoroscopic 

guidance.  Then the wire was removed and the Angled catheter was used to 

perform a pullback venogram.  Digital subtraction venography was performed in 

the left subclavian vein which demonstrated a 50 percent narrowing of the left 

innominate vein and SVC junction.  12 mm x 60 mm angioplasty balloon was used 

to perform angioplasty of the left innominate vein and SVC.  Repeat digital 

subtraction angiography demonstrated 20 percent residual narrowing.





The catheter was pulled back until clot was encountered.  4 mg of TPA were 

infused through the length of the clot to the sheath as the arterial 

anastomosis was manually compressed.  Catheter was exchanged for an 8 mm x 8 cm 

balloon and the venous limb was sequentially venoplasty.  Trerotola 

thrombectomy device was used multiple times through the venous limb. Trerotola 

device was then removed. Angled catheter and Chen wire were negotiated into 

the inferior vena cava.   The Rain balloon was used to sweep from the sheath 

to the central veins.





The arm was then punctured towards the arterial anastomosis under ultrasound 

guidance.  0.018 inch wire was advanced through the needle and exchanged for 

transitional dilator.  The inner dilator and wire were removed and a 0.035 inch 

Chen wire was advanced to the transitional dilator.  The dilator was 

exchanged for 6 Maltese short sheath.  The angled catheter was advanced over the 

0.035 wire and the wire was advanced into the native brachial artery in a 

retrograde fashion. 





Digital subtraction angiography was performed which demonstrated patency of the 

brachial artery proximal distal to the anastomosis with flow-limiting thrombus 

in the peripheral portion of the AV graft. The brachial artery to the 

anastamosis had a 50% focal narrowing at the ostium.





Rain catheter was inflated and use to sweep the anastomosis multiple times, 

pulling the plug towards the venous limb. 





Blood was aspirated from both sheaths. 





Rain catheter was exchanged for the angled catheter and digital subtraction 

angiography was performed.  This demonstrated extravasation from the midportion 

of the graft, likely due to a dialysis access back wall puncture, patency of 

the brachial artery proximal to the anastomosis, and 50% narrowing of the 

brachial artery distal to the anastomosis.





Digital subtraction angiography demonstrated no evidence of outflow narrowing  

in the axillary vein, subclavian vein, and with 20% residual narrowing of the 

left innominate vein to SVC junction.





8 mm angioplasty balloon was used to tamponade the area of extravasation for 5 

minutes, and repeat digital subtraction angiography demonstrated residual 

extravasation. This area was a suboptimal area to stent graft as it was at the 

area of stent use and would make the dialysis access severely limited 

justifying the prolonged use of an angioplasty balloon. This was repeated for 8 

minutes demonstrating residual extravasation at the mid graft.  This was 

repeated for 10 minutes which then resulted in resolution of the extravasation.

  





Digital subtraction angiography demonstrated some narrowing that started to 

occur in the theron-anastomotic portion of the AV graft likely secondary to mild 

thrombus formation.  8 mm angioplasty balloon was used to angioplasty the 

segments with resolution of the narrowing.  There was no axillary vein outflow 

narrowing.  No residual narrowing in the graft.  The subclavian narrowing, and 

with 20% narrowing of the left innominate vein SVC junction.





The brachial artery antegrade to the AV graft was selected and 6 mm x 40 mm 

angioplasty balloon is used to angioplasty this portion resulting in resolution 

of the narrowing of the brachial artery antegrade to the anastomosis.





At this point, all wires, catheters and sheaths were removed.  3-0 Vicryl 

sutures were used to close the fistula access sites.  Dermabond was applied.  

Pressure was held until hemostasis was achieved.





The patient was then transferred to the outpatient recovery area.





FINDINGS:


Please see the procedure note for the findings.





IMPRESSION:


1.  Successful pharmacomechanical thrombectomy of the thrombosed left AV graft.

  Successful angioplasty of the brachial artery.


2.  Successful angioplasty of the peripheral portion of the dialysis access and 

central portion of the dialysis access.


3.  Final imaging demonstrates no areas of focal stenosis within the AV graft 

on completion venography.  There is no evidence of flow-limiting thrombus 

within the graft on completion venography.

## 2018-09-14 NOTE — POST OPERATIVE NOTE
Date of procedure: 09/14/18


Pre-op diagnosis: ESRD


Post-op diagnosis: same


Findings: 





Mid graft extravasation from backwall puncture treated with balloon tamponade x 

23 minutes.


Procedure: 





Central and peripheral angioplasty of dialysis access


Mid graft baloon tamponade


AVG thrombectomy


Anesthesia: local (w/ conscious sedation)


Surgeon: SHARON BHATT


Estimated blood loss: minimal


Condition: stable


Disposition: no change

## 2018-09-14 NOTE — SHORT STAY SUMMARY
Short Stay Documentation


Date of service: 09/14/18


Narrative H&P: 





53 year old female with ESRD and presents with thrombosed AVG.





- History


Principal diagnosis: Thrombosed AVG


Past Medical History: diabetes, dialysis


Past Surgical History: Other (AVG placement )





- Allergies and Medications


Current Medications: 


 Allergies





amoxicillin Allergy (Verified 02/08/18 15:20)


 Unknown


codeine Allergy (Verified 02/08/18 15:20)


 Unknown


potassium chloride Allergy (Verified 02/08/18 15:20)


 Unknown


prochlorperazine [From Compazine] Allergy (Verified 02/08/18 15:20)


 Unknown





 Home Medications











 Medication  Instructions  Recorded  Confirmed  Last Taken  Type


 


Calcium Acetate [Phoslo] 2 cap PO TIDWM 12/05/17 08/21/18 1 Month Ago History





    ~07/21/18 


 


Carvedilol [Coreg] 6.25 mg PO BID 12/05/17 08/21/18 08/20/18 History


 


Torsemide [Demadex] 20 mg PO QDAY 12/05/17 08/21/18 08/20/18 History


 


Losartan [Cozaar] 50 mg PO QDAY #30 tablet 12/08/17 08/21/18 08/20/18 Rx


 


Oxycodone HCl/Acetaminophen 1 each PO Q8HR PRN #7 tablet 12/08/17 08/21/18 08/19 /18 Rx





[Percocet 7.5/325 mg]     


 


Budesonide [Pulmicort Respules] 0.5 mg IH Q12HR 02/08/18 08/21/18 08/20/18 

History


 


Ciprofloxacin  mg PO BID 08/22/18 08/22/18 Unknown History


 


metroNIDAZOLE [Flagyl TAB] 500 mg PO TID 08/22/18 08/22/18 Unknown History


 


ALBUTEROL NEB's [Proventil 0.083% 2.5 mg IH Q4HRT PRN  nebu 08/23/18  Unknown Rx





NEBS]     


 


Acetaminophen [Acetaminophen TAB] 650 mg PO Q4H PRN  tablet 08/23/18  Unknown Rx


 


Torsemide [Demadex] 20 mg PO BID@0600,1800  tablet 08/23/18  Unknown Rx








Active Medications





Sodium Chloride (Nacl 0.9% 1000 Ml)  1,000 mls @ 42 mls/hr IV AS DIRECT MICHAEL


   Last Admin: 09/14/18 15:50 Dose:  100 mls


Vancomycin HCl (Vancomycin/Ns 1 Gm/250 Ml)  1 gm in 250 mls @ 166.667 mls/hr IV 

PREOP NR; Protocol


   Stop: 09/14/18 23:00


   Last Admin: 09/14/18 15:50 Dose:  166.667 mls/hr











- Physical exam


General appearance: no acute distress, obese


HEENT: EOMI


Lungs: Normal air movement


Gastrointestinal: normal





- Brief post op/procedure progress note


Date of procedure: 09/14/18


Pre-op diagnosis: Thrombosed AVG


Post-op diagnosis: other (thrombosed AVG, mid graft backwall bleeding)


Procedure: 





Central and peripheral dialysis angioplasty


AVG thrombectomy


Mid graft balloon tamponade for 23 minutes


Anesthesia: local


Surgeon: SHARON BHATT


Estimated blood loss: minimal


Condition: stable





- Hospital course


Hospital course: 





Ready for discharge





- Disposition


Condition at discharge: Stable


Disposition: [id number] TO HOME OR SELFCARE





- Discharge Diagnoses


(1) AV graft thrombosis


Status: Acute   





(2) ESRD (end stage renal disease)


Status: Acute   





(3) Morbid obesity


Status: Acute   





Short Stay Discharge Plan


Activity: advance as tolerated


Weight Bearing Status: Weight Bear as Tolerated


Diet: renal


Wound: keep clean and dry


Additional Instructions: 


Possible admission post intervention for dialysis. Nurse will contact 

nephrologist


Follow up with: 


PRIMARY CARE,MD [Primary Care Provider] - 7 Days

## 2021-08-28 ENCOUNTER — TELEPHONE ENCOUNTER (OUTPATIENT)
Dept: URBAN - METROPOLITAN AREA CLINIC 13 | Facility: CLINIC | Age: 56
End: 2021-08-28

## 2021-08-29 ENCOUNTER — TELEPHONE ENCOUNTER (OUTPATIENT)
Dept: URBAN - METROPOLITAN AREA CLINIC 13 | Facility: CLINIC | Age: 56
End: 2021-08-29